# Patient Record
Sex: FEMALE | Race: WHITE | NOT HISPANIC OR LATINO | Employment: OTHER | URBAN - METROPOLITAN AREA
[De-identification: names, ages, dates, MRNs, and addresses within clinical notes are randomized per-mention and may not be internally consistent; named-entity substitution may affect disease eponyms.]

---

## 2017-01-01 ENCOUNTER — PATIENT OUTREACH (OUTPATIENT)
Dept: OTHER | Facility: MEDICAL CENTER | Age: 77
End: 2017-01-01

## 2017-01-30 ENCOUNTER — OFFICE VISIT (OUTPATIENT)
Dept: PULMONOLOGY | Facility: HOSPICE | Age: 77
End: 2017-01-30
Payer: MEDICARE

## 2017-01-30 VITALS
HEIGHT: 62 IN | BODY MASS INDEX: 17.19 KG/M2 | OXYGEN SATURATION: 95 % | WEIGHT: 93.4 LBS | DIASTOLIC BLOOD PRESSURE: 72 MMHG | RESPIRATION RATE: 16 BRPM | SYSTOLIC BLOOD PRESSURE: 112 MMHG | TEMPERATURE: 97 F | HEART RATE: 82 BPM

## 2017-01-30 DIAGNOSIS — J96.11 CHRONIC RESPIRATORY FAILURE WITH HYPOXIA (HCC): ICD-10-CM

## 2017-01-30 DIAGNOSIS — Z87.891 FORMER SMOKER: ICD-10-CM

## 2017-01-30 DIAGNOSIS — R53.81 PHYSICAL DECONDITIONING: ICD-10-CM

## 2017-01-30 DIAGNOSIS — J44.9 CHRONIC OBSTRUCTIVE PULMONARY DISEASE, UNSPECIFIED COPD TYPE (HCC): ICD-10-CM

## 2017-01-30 PROCEDURE — G8419 CALC BMI OUT NRM PARAM NOF/U: HCPCS | Performed by: INTERNAL MEDICINE

## 2017-01-30 PROCEDURE — 0518F FALL PLAN OF CARE DOCD: CPT | Mod: 8P | Performed by: INTERNAL MEDICINE

## 2017-01-30 PROCEDURE — 3288F FALL RISK ASSESSMENT DOCD: CPT | Mod: 8P | Performed by: INTERNAL MEDICINE

## 2017-01-30 PROCEDURE — 1100F PTFALLS ASSESS-DOCD GE2>/YR: CPT | Performed by: INTERNAL MEDICINE

## 2017-01-30 PROCEDURE — 99204 OFFICE O/P NEW MOD 45 MIN: CPT | Performed by: INTERNAL MEDICINE

## 2017-01-30 RX ORDER — DIGOXIN 125 MCG
125 TABLET ORAL DAILY
Refills: 3 | COMMUNITY
Start: 2016-12-22 | End: 2017-07-26

## 2017-01-30 RX ORDER — TIOTROPIUM BROMIDE 18 UG/1
18 CAPSULE ORAL; RESPIRATORY (INHALATION) DAILY
COMMUNITY

## 2017-01-30 RX ORDER — GUAIFENESIN 600 MG/1
600 TABLET, EXTENDED RELEASE ORAL 4 TIMES DAILY
COMMUNITY

## 2017-01-30 RX ORDER — BUDESONIDE AND FORMOTEROL FUMARATE DIHYDRATE 160; 4.5 UG/1; UG/1
AEROSOL RESPIRATORY (INHALATION)
Refills: 3 | COMMUNITY
Start: 2016-12-28

## 2017-01-30 RX ORDER — SUCRALFATE 1 G/1
1 TABLET ORAL
COMMUNITY
End: 2018-01-01

## 2017-01-30 RX ORDER — ZOLPIDEM TARTRATE 5 MG/1
TABLET ORAL
Qty: 3 TAB | Refills: 0 | Status: SHIPPED | OUTPATIENT
Start: 2017-01-30 | End: 2017-07-26

## 2017-01-30 RX ORDER — IPRATROPIUM BROMIDE AND ALBUTEROL SULFATE 2.5; .5 MG/3ML; MG/3ML
3 SOLUTION RESPIRATORY (INHALATION) 4 TIMES DAILY
COMMUNITY

## 2017-01-30 ASSESSMENT — PATIENT HEALTH QUESTIONNAIRE - PHQ9: CLINICAL INTERPRETATION OF PHQ2 SCORE: 0

## 2017-01-30 NOTE — MR AVS SNAPSHOT
"Leanna Campbell   2017 8:20 AM   Office Visit   MRN: [id number]    Department:  [facility]   Dept Phone:  [phone]    Description:  Female : 1940   Provider:  Nita Kent M.D.           Reason for Visit     Establish Care Self Referral for COPD      Allergies as of 2017     Allergen Noted Reactions    Darvon [Propoxyphene Hcl] 2010       Pcn [Penicillins] 2010   Rash    Ciprofloxacin 2010   Rash, Itching    Pt complained of itch on hands, mouth and was red in the face after 2100 dose       You were diagnosed with     Chronic obstructive pulmonary disease, unspecified COPD type (CMS-HCC)   [6259590]       Former smoker   [[id number]]         Vital Signs     Blood Pressure Pulse Temperature Respirations Height Weight    112/72 mmHg 82 36.1 °C (97 °F) 16 1.575 m (5' 2.01\") 42.366 kg (93 lb 6.4 oz)    Body Mass Index Oxygen Saturation Smoking Status             17.08 kg/m2 95% Former Smoker         Basic Information     Date Of Birth Sex Race Ethnicity Preferred Language    1940 Female White Non- English      Your appointments     2017  9:00 AM   Pulmonary Function Test with PFT-RM2   [facility] (--)    [address] [place] [address] [address]  [place] [id number] [phone]            2017 10:00 AM   Pulmonary Function Test with SPIROMETRY/6MW   [facility] (--)    [address] [facility] [address]  [place] [id number] [phone]              Problem List              ICD-10-CM Priority Class Noted - Resolved    Pulmonary insufficiency following trauma and surgery 518.5   2010 - Present    BILE LEAK    2010 - Present    COPD (chronic obstructive pulmonary disease) (CMS-HCC) J44.9 Medium  2010 - Present    Physical deconditioning R53.81   2010 - Present    Alcohol abuse F10.10   2013 - Present    Hip fracture (CMS-HCC) S72.009A High  2013 - Present    Bronchitis, acute " J20.9 Medium  2/2/2013 - Present    COPD exacerbation (CMS-[facility]) J44.1 Medium  2/2/2013 - Present    Anemia D64.9   2/14/2013 - Present    GERD (gastroesophageal reflux disease) K21.9 Low Chronic 2/14/2013 - Present      Health Maintenance     Patient has no pending health maintenance at this time      Current Immunizations     Influenza TIV (IM) 11/1/2016, 10/1/2012    Influenza Vaccine 9/13/2010    Pneumococcal Vaccine (UF)Historical Data 10/1/2009      Below and/or attached are the medications your provider expects you to take. Review all of your home medications and newly ordered medications with your provider and/or pharmacist. Follow medication instructions as directed by your provider and/or pharmacist. Please keep your medication list with you and share with your provider. Update the information when medications are discontinued, doses are changed, or new medications (including over-the-counter products) are added; and carry medication information at all times in the event of emergency situations     Allergies:  DARVON - (reactions not documented)     PCN - Rash     CIPROFLOXACIN - Rash,Itching               Medications  Valid as of: January 30, 2017 -  9:12 AM    Generic Name Brand Name Tablet Size Instructions for use    Aspirin (Tab)  MG Take 325 mg by mouth every day.    Indications: Blood Clot        Budesonide-Formoterol Fumarate (Aerosol) SYMBICORT 160-4.5 MCG/ACT INHALE 2 PUFFS BY MOUTH TWICE DAILY. RINSE MOUTH AFTER USE.        Calcium Carbonate Antacid (Chew Tab) TUMS 500 MG Take 1 Tab by mouth 2 times a day.        Cholecalciferol (Tab) VITAMIN D 400 UNIT Take 1 Tab by mouth 2 Times a Day.        Digoxin (Tab) LANOXIN 125 MCG Take 125 mcg by mouth every day. TAKE 1 TABLET BY MOUTH DAILY        Famotidine (Tab) PEPCID 20 MG Take 20 mg by mouth 2 times a day.    Indications: Gastroesophageal Reflux Disease        Ferrous Sulfate (Tab) ferrous sulfate 325 (65 FE) MG Take 325 mg by mouth 2 Times  a Day.          GuaiFENesin (TABLET SR 12 HR) MUCINEX 600 MG Take 600 mg by mouth every 12 hours.        Hydrocodone-Acetaminophen (Tab) NORCO 5-325 MG Take 1-2 Tabs by mouth every 3 hours as needed.    Indications: Moderate to Moderately Severe Pain        Ipratropium-Albuterol (Aero Soln) COMBIVENT RESPIMAT  MCG/ACT Inhale 1 Puff by mouth 4 times a day.        Ipratropium-Albuterol (Solution) DUONEB 0.5-2.5 (3) MG/3ML 3 mL by Nebulization route 4 times a day.        Multiple Vitamins-Iron (Tab) Multivitamin/Iron  Take 1 Tab by mouth every day.          Sucralfate (Tab) CARAFATE 1 GM Take 1 g by mouth 4 Times a Day,Before Meals and at Bedtime.        Theophylline   Take 200 mg by mouth 2 Times a Day.        Tiotropium Bromide Monohydrate (Cap) SPIRIVA 18 MCG Inhale 18 mcg by mouth every day.        Zolpidem Tartrate (Tab) AMBIEN 5 MG Take 1-2 tablets by mouth every evening as needed for insomnia. Bring to sleep study.        .                 Medicines prescribed today were sent to:     [facility] #[id number] - [place], [place] - [address]    [address] [address]    Phone: [phone] Fax: [phone]    Open 24 Hours?: No    [facility]    [address] [facility] [address] [place] [address]    Phone: [phone] Fax: [phone]      Medication refill instructions:       If your prescription bottle indicates you have medication refills left, it is not necessary to call your provider’s office. Please contact your pharmacy and they will refill your medication.    If your prescription bottle indicates you do not have any refills left, you may request refills at any time through one of the following ways: The online [employer] system (except Urgent Care), by calling your provider’s office, or by asking your pharmacy to contact your provider’s office with a refill request. Medication refills are processed only during regular business hours and may not be available until the next business day. Your provider may  request additional information or to have a follow-up visit with you prior to refilling your medication.   *Please Note: Medication refills are assigned a new Rx number when refilled electronically. Your pharmacy may indicate that no refills were authorized even though a new prescription for the same medication is available at the pharmacy. Please request the medicine by name with the pharmacy before contacting your provider for a refill.        Your To Do List     Future Labs/Procedures Complete By Expires    AMB PULMONARY FUNCTION TEST/LAB  As directed 1/30/2018    AMB PULMONARY STRESS TESTING (6 MIN WALK)  As directed 1/30/2018    CT-CHEST (THORAX) W/O  As directed 1/30/2018    POLYSOMNOGRAPHY, 4 OR MORE  As directed 1/30/2018         [employer] Access Code: [email or web address]  Expires: 3/1/2017  9:12 AM    Your email address is not on file at [facility] [employer].  Email Addresses are required for you to sign up for [employer], please contact [phone] to verify your personal information and to provide your email address prior to attempting to register for [employer].    Leanna Campbell  [address] [place] [id number]    [employer]  A secure, online tool to manage your health information     [employer]’s [employer]® is a secure, online tool that connects you to your personalized health information from the privacy of your home -- day or night - making it very easy for you to manage your healthcare. Once the activation process is completed, you can even access your medical information using the [employer] cisco, which is available for free in the [employer] store or Google Play store.     To learn more about [employer], visit [email or web address]    There are two levels of access available (as shown below):   My Chart Features  [facility] Primary Care Doctor [facility]  Specialists [facility]  Urgent  Care Non-[facility] Primary Care Doctor   Email your healthcare team securely and privately 24/7 X X X    Manage appointments: schedule your next  appointment; view details of past/upcoming appointments X      Request prescription refills. X      View recent personal medical records, including lab and immunizations X X X X   View health record, including health history, allergies, medications X X X X   Read reports about your outpatient visits, procedures, consult and ER notes X X X X   See your discharge summary, which is a recap of your hospital and/or ER visit that includes your diagnosis, lab results, and care plan X X  X     How to register for [employer]:  Once your e-mail address has been verified, follow the following steps to sign up for [employer].     1. Go to  [email or web address]  2. Click on the Sign Up Now box, which takes you to the New Member Sign Up page. You will need to provide the following information:  a. Enter your [employer] Access Code exactly as it appears at the top of this page. (You will not need to use this code after you’ve completed the sign-up process. If you do not sign up before the expiration date, you must request a new code.)   b. Enter your date of birth.   c. Enter your home email address.   d. Click Submit, and follow the next screen’s instructions.  3. Create a [employer] ID. This will be your [employer] login ID and cannot be changed, so think of one that is secure and easy to remember.  4. Create a [employer] password. You can change your password at any time.  5. Enter your Password Reset Question and Answer. This can be used at a later time if you forget your password.   6. Enter your e-mail address. This allows you to receive e-mail notifications when new information is available in [employer].  7. Click Sign Up. You can now view your health information.    For assistance activating your [employer] account, call [phone]

## 2017-01-30 NOTE — PROGRESS NOTES
Chief Complaint   Patient presents with   • Establish Care     Self Referral for COPD       HPI: This patient is a 77 y.o. Female who presents for management of COPD. She is a [facility] in 2011 at which time PFTs showed stage II COPD with FEV1: 1 L, or 46% predicted. She quit smoking in 2013 with 50 pack years estimated. Her shortness of breath has gradually worsened over the past year. She is short of breath with ADLs. She has chronic productive cough and intermittent wheezing. Denies hemoptysis, chest pain, or edema. She is compliant with Symbicort 160 µg, Spiriva and theophylline. She is oxygen dependent at 3 L/m. She has a poor appetite, has lost weight, with a BMI:17. She denies AECOPD over the past year. She is up-to-date on influenza vaccine.    Her daughters were in the medical field, have witnessed apneas while she sleeps. She is unaware of symptoms however has nonrestorative sleep.    Past Medical History   Diagnosis Date   • COPD    • GERD (gastroesophageal reflux disease)    • Bowel obstruction (CMS-HCC)    • On home oxygen therapy      2 liters   • Arthritis      knee, back   • Urinary bladder disorder 11/29/10     UTI, treated with antibiotics   • Heart burn    • Indigestion    • Other specified disorder of intestines      constipation   • Dental disorder      upper and lower dentures   • Breath shortness      uses oxygen 1.5 liters at noc and prn   • Anesthesia      PONV   • Cancer (CMS-HCC) 1998     right breast   • Carcinoma (CMS-HCC) 2002 and 2013     Skin cancer   • Back pain    • Bronchitis    • Breast cancer (CMS-HCC)    • Osteoporosis    • Pneumonia        Social History     Social History   • Marital Status: Single     Spouse Name: N/A   • Number of Children: N/A   • Years of Education: N/A     Occupational History   • Not on file.     Social History Main Topics   • Smoking status: Former Smoker -- 1.00 packs/day for 50 years     Types: Cigarettes     Quit  date: 02/01/2013   • Smokeless tobacco: Former User     Quit date: 02/01/2013   • Alcohol Use: Yes      Comment: Glass of wine 4x/week   • Drug Use: No   • Sexual Activity: Not on file     Other Topics Concern   • Not on file     Social History Narrative       Family History   Problem Relation Age of Onset   • Cancer         Current Outpatient Prescriptions on File Prior to Visit   Medication Sig Dispense Refill   • aspirin (ASA) 325 MG TABS Take 325 mg by mouth every day.    Indications: Blood Clot     • ferrous sulfate (FEOSOL) 325 (65 FE) MG tablet Take 325 mg by mouth 2 Times a Day.       • famotidine (PEPCID) 20 MG TABS Take 20 mg by mouth 2 times a day.    Indications: Gastroesophageal Reflux Disease     • hydrocodone-acetaminophen (NORCO) 5-325 MG TABS per tablet Take 1-2 Tabs by mouth every 3 hours as needed.    Indications: Moderate to Moderately Severe Pain     • Multiple Vitamins-Iron (MULTIVITAMIN/IRON) TABS Take 1 Tab by mouth every day.       • calcium carbonate (TUMS) 500 MG CHEW Take 1 Tab by mouth 2 times a day.     • Cholecalciferol (VITAMIN D) 400 UNIT TABS Take 1 Tab by mouth 2 Times a Day.       No current facility-administered medications on file prior to visit.       Allergies: Darvon; Pcn; and Ciprofloxacin    ROS:   Constitutional: Denies fevers, chills, night sweats, fatigue, + weight loss  Eyes: Denies vision loss, pain, drainage, double vision  Ears, Nose, Throat: Denies earache, difficulty hearing, tinnitus, nasal congestion, hoarseness  Cardiovascular: Denies chest pain, tightness, palpitations, orthopnea or edema  Respiratory: As in history of present illness  Sleep: Denies daytime sleepiness, snoring, +apneas, denies insomnia, morning headaches  GI: Denies heartburn, dysphagia, nausea, abdominal pain, +iarrhea, denies constipation  : Denies frequent urination, hematuria, discharge or painful urination  Musculoskeletal: Denies back pain, painful joints, sore muscles  Neurological:  "Denies weakness or headaches  Skin: No rashes    Blood pressure 112/72, pulse 82, temperature 36.1 °C (97 °F), resp. rate 16, height 1.575 m (5' 2.01\"), weight 42.366 kg (93 lb 6.4 oz), SpO2 95 %.  Multi-Ox Readings  Multi Ox #1     O2 sat % at rest     O2 sat % on exertion     O2 sat average on exertion     Multi Ox #2     O2 sat % at rest     O2 sat % on exertion     O2 sat average on exertion       Oxygen Use 3   Oxygen Frequency With exertion and nocturnal   Duration of need     Is the patient mobile within the home?     CPAP Use?     BIPAP Use?     Servo Titration         Physical Exam:  Appearance: Cachectic, in no acute distress  HEENT: Normocephalic, atraumatic, white sclera, PERRLA, oropharynx clear  Neck: No adenopathy or masses  Respiratory: no intercostal retractions or accessory muscle use  Lungs auscultation: Clear to auscultation bilaterally, diminished breath sounds  Cardiovascular: Regular rate rhythm. No murmurs, rubs or gallops.  No LE edema  Abdomen: soft, nondistended  Gait: Normal  Digits: No clubbing, cyanosis  Motor: No focal deficits  Orientation: Oriented to time, person and place    Diagnosis:  1. Chronic obstructive pulmonary disease, unspecified COPD type (CMS-HCC)  AMB PULMONARY FUNCTION TEST/LAB    AMB PULMONARY STRESS TESTING (6 MIN WALK)    CT-CHEST (THORAX) W/O    POLYSOMNOGRAPHY, 4 OR MORE    zolpidem (AMBIEN) 5 MG Tab   2. Chronic respiratory failure with hypoxia (CMS-HCC)     3. Former smoker     4. Physical deconditioning         Plan:  The patient has progressive respiratory symptoms from advanced COPD.  We will update pulmonary function testing with DLCO for qualification for pulmonary rehabilitation.  Obtain 6 minute walk test on 3 L/m oxygen.   Evaluation for portable oxygen concentrator.  Chest CAT scan without contrast to exclude other pulmonary comorbidities, i.e., infection, malignancy.  Overnight polysomnography for screening of sleep-disordered breathing.  Continue " Symbicort 160/4.5 at 2 puffs twice a day, Spiriva at one puff daily, theophylline, oxygen at 3 L/m.  Start Ensure one can daily.  Return for after testing.

## 2017-01-31 ENCOUNTER — HOSPITAL ENCOUNTER (OUTPATIENT)
Dept: RADIOLOGY | Facility: MEDICAL CENTER | Age: 77
End: 2017-01-31

## 2017-02-22 ENCOUNTER — NON-PROVIDER VISIT (OUTPATIENT)
Dept: PULMONOLOGY | Facility: HOSPICE | Age: 77
End: 2017-02-22
Attending: INTERNAL MEDICINE
Payer: MEDICARE

## 2017-02-22 ENCOUNTER — NON-PROVIDER VISIT (OUTPATIENT)
Dept: PULMONOLOGY | Facility: HOSPICE | Age: 77
End: 2017-02-22
Payer: MEDICARE

## 2017-02-22 DIAGNOSIS — J44.9 CHRONIC OBSTRUCTIVE PULMONARY DISEASE, UNSPECIFIED COPD TYPE (HCC): ICD-10-CM

## 2017-02-22 ASSESSMENT — 6 MINUTE WALK TEST (6MWT)
TOTAL DISTANCE WALKED: 940
PERCENT OF NORMAL WALKED: 57
SAO2 AT 4 MIN: 93
PERCEIVED FATIGUE AT 1 MIN: 0
HEART RATE AT 6 MIN: 105
DISTANCE WALKED (FT): 160
PERCEIVED FATIGUE AT 5 MIN: 0
PERCEIVED FATIGUE AT 3 MIN: 0
SAO2 AT 1 MIN: 94
HEART RATE AT 4 MIN: 102
PERCEIVED FATIGUE AT 6 MIN: 0
SAO2 AT 3 MIN: 95
HEART RATE AT 1 MIN: 100
PERCEIVED FATIGUE AT 1 MIN: 0
DISTANCE WALKED (FT): 140
HEART RATE AT 1 MIN: 95
BLOOD PRESSURE AT 1 MIN: 130/82
O2 FLOW RATE - LITERS PER MIN: 3
O2 SAT PERCENT ON O2: 93
SAO2 AT 1 MIN: 95
BLOOD PRESSURE: RIGHT LEG
HEART RATE: 93
SAO2 AT 2 MIN: 94
DISTANCE WALKED (FT): 180
NUMBER OF RESTS: 0
DISTANCE WALKED (FT): 140
PERCEIVED FATIGUE AT 4 MIN: 0
HEART RATE AT 5 MIN: 104
HEART RATE AT 2 MIN: 100
PERCEIVED BREATHLESSNESS AT 1 MIN: 4
SAO2 AT 2 MIN: 94
HEART RATE AT 3 MIN: 102
SITTING BLOOD PRESSURE: 128/80
DISTANCE WALKED (FT): 180
SAO2 AT 6 MIN: 94
O2 SATURATION: CONTINUOUS
PERCEIVED BREATHLESSNESS AT 1 MIN: 5
TOTAL REST TIME: 0
SAO2 AT 5 MIN: 94
PERCEIVED BREATHLESSNESS AT 6 MIN: 6
PERCEIVED FATIGUE AT 2 MIN: 0
PERCEIVED FATIGUE AT 2 MIN: 0
DISTANCE WALKED (FT): 140
HEART RATE AT 2 MIN: 88
PERCEIVED BREATHLESSNESS AT 3 MIN: 5
AMBULATES WITH O2: WITH O2
PERCEIVED BREATHLESSNESS AT 2 MIN: 5
PERCEIVED BREATHLESSNESS AT 4 MIN: 5
PERCEIVED BREATHLESSNESS AT 5 MIN: 6
PERCEIVED BREATHLESSNESS AT 2 MIN: 5

## 2017-02-22 ASSESSMENT — PULMONARY FUNCTION TESTS
FVC: 1.96
FVC_PREDICTED: 2.69
FEV1: .75
FVC: 1.53
FEV1/FVC: 44
FEV1_PERCENT_PREDICTED: 34
FEV1/FVC: 38.27
FEV1/FVC_PERCENT_PREDICTED: 61
FVC_PERCENT_PREDICTED: 56
FEV1: .68
FEV1_PERCENT_CHANGE: 28
FEV1_PERCENT_PREDICTED: 37
FEV1/FVC_PERCENT_PREDICTED: 75
FEV1_PREDICTED: 2.01
FEV1/FVC_PERCENT_PREDICTED: 51
FEV1_PERCENT_CHANGE: 8
FVC_PERCENT_PREDICTED: 72
FEV1/FVC_PERCENT_CHANGE: 29

## 2017-02-22 NOTE — PROCEDURES
Test on 3 lpm 02.    This is a 6MWT. Entire 6 minutes were completed on supplemental oxygen at a rate of 3LPM. Lowest SpO2 was recorded as 93%. A total of 940 ft was completed without stops. HR was 100-105bpm.    Impression:  No desaturation on 3LPM of oxygen.

## 2017-02-22 NOTE — MR AVS SNAPSHOT
Leanna Larkin Adrian   2017 9:00 AM   Non-Provider Visit   MRN: [id number]    Department:  [facility]   Dept Phone:  [phone]    Description:  Female : 1940   Provider:  Juan R Juarez M.D.           Reason for Visit     COPD           Allergies as of 2017     Allergen Noted Reactions    Darvon [Propoxyphene Hcl] 2010       Pcn [Penicillins] 2010   Rash    Ciprofloxacin 2010   Rash, Itching    Pt complained of itch on hands, mouth and was red in the face after 2100 dose       You were diagnosed with     Chronic obstructive pulmonary disease, unspecified COPD type (CMS-HCC)   [2304775]         Vital Signs     Smoking Status                   Former Smoker           Basic Information     Date Of Birth Sex Race Ethnicity Preferred Language    1940 Female White Non- English      Your appointments     Mar 01, 2017  7:10 PM   Sleep Study Diagnostic with SLEEP TECH   [facility] (--)    [address]  [address]  [place] [id number] [phone]            2017  9:40 AM   Established Patient Pul with Juan R Juarez M.D.   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.5   2010 - Present    BILE LEAK    2010 - Present    COPD (chronic obstructive pulmonary disease) (CMS-HCC) J44.9 Medium  2010 - Present    Physical deconditioning R53.81   2010 - Present    Alcohol abuse F10.10   2013 - Present    Hip fracture (CMS-HCC) S72.009A High  2013 - Present    Bronchitis, acute J20.9 Medium  2013 - Present    COPD exacerbation (CMS-HCC) J44.1 Medium  2013 - Present    Anemia D64.9   2013 - Present    GERD (gastroesophageal reflux disease) K21.9 Low Chronic 2013 - Present      Health  Maintenance        Date Due Completion Dates    IMM DTaP/Tdap/Td Vaccine (1 - Tdap) 1/16/1961 ---    PAP SMEAR 1/16/1963 ---    COLONOSCOPY 1/16/1992 ---    IMM ZOSTER VACCINE 1/16/2002 ---    BONE DENSITY 1/16/2007 ---    IMM PNEUMOCOCCAL 65+ (ADULT) LOW/MEDIUM RISK SERIES (1 of 2 - PCV13) 1/16/2007 ---    MAMMOGRAM 9/28/2008 9/28/2007, 9/28/2007            Current Immunizations     Influenza TIV (IM) 11/1/2016, 10/1/2012    Influenza Vaccine 9/13/2010    Pneumococcal Vaccine (UF)Historical Data 10/1/2009      Below and/or attached are the medications your provider expects you to take. Review all of your home medications and newly ordered medications with your provider and/or pharmacist. Follow medication instructions as directed by your provider and/or pharmacist. Please keep your medication list with you and share with your provider. Update the information when medications are discontinued, doses are changed, or new medications (including over-the-counter products) are added; and carry medication information at all times in the event of emergency situations     Allergies:  DARVON - (reactions not documented)     PCN - Rash     CIPROFLOXACIN - Rash,Itching               Medications  Valid as of: February 22, 2017 - 10:11 AM    Generic Name Brand Name Tablet Size Instructions for use    Aspirin (Tab)  MG Take 325 mg by mouth every day.    Indications: Blood Clot        Budesonide-Formoterol Fumarate (Aerosol) SYMBICORT 160-4.5 MCG/ACT INHALE 2 PUFFS BY MOUTH TWICE DAILY. RINSE MOUTH AFTER USE.        Calcium Carbonate Antacid (Chew Tab) TUMS 500 MG Take 1 Tab by mouth 2 times a day.        Cholecalciferol (Tab) VITAMIN D 400 UNIT Take 1 Tab by mouth 2 Times a Day.        Digoxin (Tab) LANOXIN 125 MCG Take 125 mcg by mouth every day. TAKE 1 TABLET BY MOUTH DAILY        Famotidine (Tab) PEPCID 20 MG Take 20 mg by mouth 2 times a day.    Indications: Gastroesophageal Reflux Disease        Ferrous Sulfate (Tab)  ferrous sulfate 325 (65 FE) MG Take 325 mg by mouth 2 Times a Day.          GuaiFENesin (TABLET SR 12 HR) MUCINEX 600 MG Take 600 mg by mouth every 12 hours.        Hydrocodone-Acetaminophen (Tab) NORCO 5-325 MG Take 1-2 Tabs by mouth every 3 hours as needed.    Indications: Moderate to Moderately Severe Pain        Ipratropium-Albuterol (Aero Soln) COMBIVENT RESPIMAT  MCG/ACT Inhale 1 Puff by mouth 4 times a day.        Ipratropium-Albuterol (Solution) DUONEB 0.5-2.5 (3) MG/3ML 3 mL by Nebulization route 4 times a day.        Multiple Vitamins-Iron (Tab) Multivitamin/Iron  Take 1 Tab by mouth every day.          Sucralfate (Tab) CARAFATE 1 GM Take 1 g by mouth 4 Times a Day,Before Meals and at Bedtime.        Theophylline   Take 200 mg by mouth 2 Times a Day.        Tiotropium Bromide Monohydrate (Cap) SPIRIVA 18 MCG Inhale 18 mcg by mouth every day.        Zolpidem Tartrate (Tab) AMBIEN 5 MG Take 1-2 tablets by mouth every evening as needed for insomnia. Bring to sleep study.        .                 Medicines prescribed today were sent to:     [facility]/PHARMACY #[id number] - [place], [place] - [address]    [address] [place] [address]    Phone: [phone] Fax: [phone]    Open 24 Hours?: No    [facility]    [address] [place] [address]    Phone: [phone] Fax: [phone]      Medication refill instructions:       If your prescription bottle indicates you have medication refills left, it is not necessary to call your provider’s office. Please contact your pharmacy and they will refill your medication.    If your prescription bottle indicates you do not have any refills left, you may request refills at any time through one of the following ways: The online ONtheAIR system (except Urgent Care), by calling your provider’s office, or by asking your pharmacy to contact your provider’s office with a refill request. Medication refills are processed only during regular business hours and may not be  available until the next business day. Your provider may request additional information or to have a follow-up visit with you prior to refilling your medication.   *Please Note: Medication refills are assigned a new Rx number when refilled electronically. Your pharmacy may indicate that no refills were authorized even though a new prescription for the same medication is available at the pharmacy. Please request the medicine by name with the pharmacy before contacting your provider for a refill.           Markr Access Code: [email or web address]  Expires: 3/1/2017  9:12 AM    Markr  A secure, online tool to manage your health information     [employer]’s Markr® is a secure, online tool that connects you to your personalized health information from the privacy of your home -- day or night - making it very easy for you to manage your healthcare. Once the activation process is completed, you can even access your medical information using the Markr cisco, which is available for free in the Apple Cisco store or Google Play store.     Markr provides the following levels of access (as shown below):   My Chart Features   [facility] Primary Care Doctor [facility]  Specialists [facility]  Urgent  Care Non-[facility]  Primary Care  Doctor   Email your healthcare team securely and privately 24/7 X X X    Manage appointments: schedule your next appointment; view details of past/upcoming appointments X      Request prescription refills. X      View recent personal medical records, including lab and immunizations X X X X   View health record, including health history, allergies, medications X X X X   Read reports about your outpatient visits, procedures, consult and ER notes X X X X   See your discharge summary, which is a recap of your hospital and/or ER visit that includes your diagnosis, lab results, and care plan. X X       How to register for Markr:  1. Go to  [email or web address].  2. Click on the Sign Up Now box, which takes  you to the New Member Sign Up page. You will need to provide the following information:  a. Enter your tomoguides Access Code exactly as it appears at the top of this page. (You will not need to use this code after you’ve completed the sign-up process. If you do not sign up before the expiration date, you must request a new code.)   b. Enter your date of birth.   c. Enter your home email address.   d. Click Submit, and follow the next screen’s instructions.  3. Create a tomoguides ID. This will be your tomoguides login ID and cannot be changed, so think of one that is secure and easy to remember.  4. Create a tomoguides password. You can change your password at any time.  5. Enter your Password Reset Question and Answer. This can be used at a later time if you forget your password.   6. Enter your e-mail address. This allows you to receive e-mail notifications when new information is available in tomoguides.  7. Click Sign Up. You can now view your health information.    For assistance activating your tomoguides account, call [phone]

## 2017-02-22 NOTE — PROCEDURES
Technician: Kim Reyes, RRT/CPFT  Technician Comments:  Good patient effort & cooperation.  The results of this test meet the ATS/ERS standards for acceptability and repeatability.  Test was performed on the [employer] Body Plethysmograph- Elite DX system.  Predicted equations for Spirometry are NHanes, DLCO- [facility].  The DLCO was uncorrected for Hgb.  A bronchodilator of Ventolin HFA- 2puffs via spacer were administered.    Spirometry is consistent with severe airflow obstruction and there was a significant improvement after bronchodilator. The forced vital capacity increased from 1.53 L to 1.96 L and at 28% improvement. Lung volumes are consistent with moderate hyperinflation and severe air trapping. The diffusion capacity test is moderately reduced. Flow volume loops are consistent with airflow obstruction.

## 2017-02-22 NOTE — MR AVS SNAPSHOT
Leanna Larkin Adrian   2017 10:00 AM   Appointment   MRN: [id number]    Department:  [facility]   Dept Phone:  [phone]    Description:  Female : 1940   Provider:  SPIROMETRY/6MW           Allergies as of 2017     Allergen Noted Reactions    Darvon [Propoxyphene Hcl] 2010       Pcn [Penicillins] 2010   Rash    Ciprofloxacin 2010   Rash, Itching    Pt complained of itch on hands, mouth and was red in the face after 2100 dose       You were diagnosed with     Chronic obstructive pulmonary disease, unspecified COPD type (CMS-HCC)   [0858704]         Vital Signs     Smoking Status                   Former Smoker           Basic Information     Date Of Birth Sex Race Ethnicity Preferred Language    1940 Female White Non- English      Your appointments     Mar 01, 2017  7:10 PM   Sleep Study Diagnostic with SLEEP TECH   [facility] (--)    [address]  [place] [address] [phone]            2017  9:40 AM   Established Patient Pul with Juan R Juarez M.D.   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   2010 - Present    BILE LEAK    2010 - Present    COPD (chronic obstructive pulmonary disease) (CMS-HCC) J44.9 Medium  2010 - Present    Physical deconditioning R53.81   2010 - Present    Alcohol abuse F10.10   2013 - Present    Hip fracture (CMS-HCC) S72.009A High  2013 - Present    Bronchitis, acute J20.9 Medium  2013 - Present    COPD exacerbation (CMS-HCC) J44.1 Medium  2013 - Present    Anemia D64.9   2013 - Present    GERD (gastroesophageal reflux disease) K21.9 Low Chronic 2013 - Present      Health Maintenance        Date Due Completion Dates    IMM  DTaP/Tdap/Td Vaccine (1 - Tdap) 1/16/1961 ---    PAP SMEAR 1/16/1963 ---    COLONOSCOPY 1/16/1992 ---    IMM ZOSTER VACCINE 1/16/2002 ---    BONE DENSITY 1/16/2007 ---    IMM PNEUMOCOCCAL 65+ (ADULT) LOW/MEDIUM RISK SERIES (1 of 2 - PCV13) 1/16/2007 ---    MAMMOGRAM 9/28/2008 9/28/2007, 9/28/2007            Current Immunizations     Influenza TIV (IM) 11/1/2016, 10/1/2012    Influenza Vaccine 9/13/2010    Pneumococcal Vaccine (UF)Historical Data 10/1/2009      Below and/or attached are the medications your provider expects you to take. Review all of your home medications and newly ordered medications with your provider and/or pharmacist. Follow medication instructions as directed by your provider and/or pharmacist. Please keep your medication list with you and share with your provider. Update the information when medications are discontinued, doses are changed, or new medications (including over-the-counter products) are added; and carry medication information at all times in the event of emergency situations     Allergies:  DARVON - (reactions not documented)     PCN - Rash     CIPROFLOXACIN - Rash,Itching               Medications  Valid as of: February 22, 2017 - 10:18 AM    Generic Name Brand Name Tablet Size Instructions for use    Aspirin (Tab)  MG Take 325 mg by mouth every day.    Indications: Blood Clot        Budesonide-Formoterol Fumarate (Aerosol) SYMBICORT 160-4.5 MCG/ACT INHALE 2 PUFFS BY MOUTH TWICE DAILY. RINSE MOUTH AFTER USE.        Calcium Carbonate Antacid (Chew Tab) TUMS 500 MG Take 1 Tab by mouth 2 times a day.        Cholecalciferol (Tab) VITAMIN D 400 UNIT Take 1 Tab by mouth 2 Times a Day.        Digoxin (Tab) LANOXIN 125 MCG Take 125 mcg by mouth every day. TAKE 1 TABLET BY MOUTH DAILY        Famotidine (Tab) PEPCID 20 MG Take 20 mg by mouth 2 times a day.    Indications: Gastroesophageal Reflux Disease        Ferrous Sulfate (Tab) ferrous sulfate 325 (65 FE) MG Take 325 mg by mouth  2 Times a Day.          GuaiFENesin (TABLET SR 12 HR) MUCINEX 600 MG Take 600 mg by mouth every 12 hours.        Hydrocodone-Acetaminophen (Tab) NORCO 5-325 MG Take 1-2 Tabs by mouth every 3 hours as needed.    Indications: Moderate to Moderately Severe Pain        Ipratropium-Albuterol (Aero Soln) COMBIVENT RESPIMAT  MCG/ACT Inhale 1 Puff by mouth 4 times a day.        Ipratropium-Albuterol (Solution) DUONEB 0.5-2.5 (3) MG/3ML 3 mL by Nebulization route 4 times a day.        Multiple Vitamins-Iron (Tab) Multivitamin/Iron  Take 1 Tab by mouth every day.          Sucralfate (Tab) CARAFATE 1 GM Take 1 g by mouth 4 Times a Day,Before Meals and at Bedtime.        Theophylline   Take 200 mg by mouth 2 Times a Day.        Tiotropium Bromide Monohydrate (Cap) SPIRIVA 18 MCG Inhale 18 mcg by mouth every day.        Zolpidem Tartrate (Tab) AMBIEN 5 MG Take 1-2 tablets by mouth every evening as needed for insomnia. Bring to sleep study.        .                 Medicines prescribed today were sent to:     [facility]/PHARMACY #[id number] - [place], [place] - [address]    [address] [address]    Phone: [phone] Fax: [phone]    Open 24 Hours?: No    [facility]    [address] [place] [address]    Phone: [phone] Fax: [phone]      Medication refill instructions:       If your prescription bottle indicates you have medication refills left, it is not necessary to call your provider’s office. Please contact your pharmacy and they will refill your medication.    If your prescription bottle indicates you do not have any refills left, you may request refills at any time through one of the following ways: The online Applauze system (except Urgent Care), by calling your provider’s office, or by asking your pharmacy to contact your provider’s office with a refill request. Medication refills are processed only during regular business hours and may not be available until the next business day. Your provider  may request additional information or to have a follow-up visit with you prior to refilling your medication.   *Please Note: Medication refills are assigned a new Rx number when refilled electronically. Your pharmacy may indicate that no refills were authorized even though a new prescription for the same medication is available at the pharmacy. Please request the medicine by name with the pharmacy before contacting your provider for a refill.           moneymeets Access Code: [email or web address]  Expires: 3/1/2017  9:12 AM    moneymeets  A secure, online tool to manage your health information     [employer]’s moneymeets® is a secure, online tool that connects you to your personalized health information from the privacy of your home -- day or night - making it very easy for you to manage your healthcare. Once the activation process is completed, you can even access your medical information using the moneymeets cisco, which is available for free in the Apple Cisco store or Google Play store.     moneymeets provides the following levels of access (as shown below):   My Chart Features   [facility] Primary Care Doctor [facility]  Specialists [facility]  Urgent  Care [facility]  Primary Care  Doctor   Email your healthcare team securely and privately 24/7 X X X    Manage appointments: schedule your next appointment; view details of past/upcoming appointments X      Request prescription refills. X      View recent personal medical records, including lab and immunizations X X X X   View health record, including health history, allergies, medications X X X X   Read reports about your outpatient visits, procedures, consult and ER notes X X X X   See your discharge summary, which is a recap of your hospital and/or ER visit that includes your diagnosis, lab results, and care plan. X X       How to register for moneymeets:  1. Go to  [email or web address].  2. Click on the Sign Up Now box, which takes you to the New Member Sign Up page. You will need to  provide the following information:  a. Enter your Glythera Access Code exactly as it appears at the top of this page. (You will not need to use this code after you’ve completed the sign-up process. If you do not sign up before the expiration date, you must request a new code.)   b. Enter your date of birth.   c. Enter your home email address.   d. Click Submit, and follow the next screen’s instructions.  3. Create a Glythera ID. This will be your Glythera login ID and cannot be changed, so think of one that is secure and easy to remember.  4. Create a Glythera password. You can change your password at any time.  5. Enter your Password Reset Question and Answer. This can be used at a later time if you forget your password.   6. Enter your e-mail address. This allows you to receive e-mail notifications when new information is available in Glythera.  7. Click Sign Up. You can now view your health information.    For assistance activating your Glythera account, call [phone]

## 2017-03-01 ENCOUNTER — SLEEP STUDY (OUTPATIENT)
Dept: SLEEP MEDICINE | Facility: MEDICAL CENTER | Age: 77
End: 2017-03-01
Attending: INTERNAL MEDICINE
Payer: MEDICARE

## 2017-03-01 DIAGNOSIS — J44.9 CHRONIC OBSTRUCTIVE PULMONARY DISEASE, UNSPECIFIED COPD TYPE (HCC): ICD-10-CM

## 2017-03-01 NOTE — MR AVS SNAPSHOT
Leanna Larkin Adrian   3/1/2017 7:10 PM   Sleep Study   MRN: [id number]    Department:  [facility]   Dept Phone:  [phone]    Description:  Female : 1940   Provider:  Trey BRITO M.D.           Allergies as of 3/1/2017     Allergen Noted Reactions    Darvon [Propoxyphene Hcl] 2010       Pcn [Penicillins] 2010   Rash    Ciprofloxacin 2010   Rash, Itching    Pt complained of itch on hands, mouth and was red in the face after 2100 dose       You were diagnosed with     Chronic obstructive pulmonary disease, unspecified COPD type (CMS-HCC)   [5118251]         Vital Signs     Smoking Status                   Former Smoker           Basic Information     Date Of Birth Sex Race Ethnicity Preferred Language    1940 Female White Non- English      Your appointments     2017  9:40 AM   Established Patient Pul with Juan R Juarez M.D.   [facility] (--)    [address] [facility] [address]  [facility] [id number] [phone]              Problem List              ICD-10-CM Priority Class Noted - Resolved    Pulmonary insufficiency following trauma and surgery 518.5   2010 - Present    BILE LEAK    2010 - Present    COPD (chronic obstructive pulmonary disease) (CMS-HCC) J44.9 Medium  2010 - Present    Physical deconditioning R53.81   2010 - Present    Alcohol abuse F10.10   2013 - Present    Hip fracture (CMS-HCC) S72.009A High  2013 - Present    Bronchitis, acute J20.9 Medium  2013 - Present    COPD exacerbation (CMS-HCC) J44.1 Medium  2013 - Present    Anemia D64.9   2013 - Present    GERD (gastroesophageal reflux disease) K21.9 Low Chronic 2013 - Present      Health Maintenance        Date Due Completion Dates    IMM DTaP/Tdap/Td Vaccine (1 - Tdap) 1961 ---    PAP SMEAR 1963 ---    COLONOSCOPY 1992 ---    IMM ZOSTER VACCINE 2002 ---    BONE DENSITY 2007 ---    IMM  PNEUMOCOCCAL 65+ (ADULT) LOW/MEDIUM RISK SERIES (1 of 2 - PCV13) 1/16/2007 ---    MAMMOGRAM 9/28/2008 9/28/2007, 9/28/2007            Current Immunizations     Influenza TIV (IM) 11/1/2016, 10/1/2012    Influenza Vaccine 9/13/2010    Pneumococcal Vaccine (UF)Historical Data 10/1/2009      Below and/or attached are the medications your provider expects you to take. Review all of your home medications and newly ordered medications with your provider and/or pharmacist. Follow medication instructions as directed by your provider and/or pharmacist. Please keep your medication list with you and share with your provider. Update the information when medications are discontinued, doses are changed, or new medications (including over-the-counter products) are added; and carry medication information at all times in the event of emergency situations     Allergies:  DARVON - (reactions not documented)     PCN - Rash     CIPROFLOXACIN - Rash,Itching               Medications  Valid as of: March 02, 2017 -  6:47 AM    Generic Name Brand Name Tablet Size Instructions for use    Aspirin (Tab)  MG Take 325 mg by mouth every day.    Indications: Blood Clot        Budesonide-Formoterol Fumarate (Aerosol) SYMBICORT 160-4.5 MCG/ACT INHALE 2 PUFFS BY MOUTH TWICE DAILY. RINSE MOUTH AFTER USE.        Calcium Carbonate Antacid (Chew Tab) TUMS 500 MG Take 1 Tab by mouth 2 times a day.        Cholecalciferol (Tab) VITAMIN D 400 UNIT Take 1 Tab by mouth 2 Times a Day.        Digoxin (Tab) LANOXIN 125 MCG Take 125 mcg by mouth every day. TAKE 1 TABLET BY MOUTH DAILY        Famotidine (Tab) PEPCID 20 MG Take 20 mg by mouth 2 times a day.    Indications: Gastroesophageal Reflux Disease        Ferrous Sulfate (Tab) ferrous sulfate 325 (65 FE) MG Take 325 mg by mouth 2 Times a Day.          GuaiFENesin (TABLET SR 12 HR) MUCINEX 600 MG Take 600 mg by mouth every 12 hours.        Hydrocodone-Acetaminophen (Tab) NORCO 5-325 MG Take 1-2 Tabs by  mouth every 3 hours as needed.    Indications: Moderate to Moderately Severe Pain        Ipratropium-Albuterol (Aero Soln) COMBIVENT RESPIMAT  MCG/ACT Inhale 1 Puff by mouth 4 times a day.        Ipratropium-Albuterol (Solution) DUONEB 0.5-2.5 (3) MG/3ML 3 mL by Nebulization route 4 times a day.        Multiple Vitamins-Iron (Tab) Multivitamin/Iron  Take 1 Tab by mouth every day.          Sucralfate (Tab) CARAFATE 1 GM Take 1 g by mouth 4 Times a Day,Before Meals and at Bedtime.        Theophylline   Take 200 mg by mouth 2 Times a Day.        Tiotropium Bromide Monohydrate (Cap) SPIRIVA 18 MCG Inhale 18 mcg by mouth every day.        Zolpidem Tartrate (Tab) AMBIEN 5 MG Take 1-2 tablets by mouth every evening as needed for insomnia. Bring to sleep study.        .                 Medicines prescribed today were sent to:     [facility]Y #[id number] - [place], [place] - [address]    [address] [place] [address]    Phone: [phone] Fax: [phone]    Open 24 Hours?: No    DME [facility]    [address] [facility] [address] [place] [address]    Phone: [phone] Fax: [phone]      Medication refill instructions:       If your prescription bottle indicates you have medication refills left, it is not necessary to call your provider’s office. Please contact your pharmacy and they will refill your medication.    If your prescription bottle indicates you do not have any refills left, you may request refills at any time through one of the following ways: The online [employer] system (except Urgent Care), by calling your provider’s office, or by asking your pharmacy to contact your provider’s office with a refill request. Medication refills are processed only during regular business hours and may not be available until the next business day. Your provider may request additional information or to have a follow-up visit with you prior to refilling your medication.   *Please Note: Medication refills are assigned a new Rx number  when refilled electronically. Your pharmacy may indicate that no refills were authorized even though a new prescription for the same medication is available at the pharmacy. Please request the medicine by name with the pharmacy before contacting your provider for a refill.           MyChart Status: Patient Declined

## 2017-03-02 NOTE — PROCEDURES
CLINICAL COMMENTS:  The patient underwent a split night polysomnogram with a CPAP titration using the standard montage for measurement of parameters of sleep, respiratory events, movement abnormalities, heart rate and rhythm. A microphone was used to monitor snoring.    ANALYSIS: The diagnostic recording time was 269.7 minutes with a sleep period of 243.0 minutes.  Total sleep time was 222.5 minutes with a sleep efficiency of 82.5%.  The sleep latency was 26.7 minutes, and REM latency was 71.5 minutes.  The patient had 28 arousals in total, for an arousal index of 7.6.        RESPIRATORY: The patient had 35 apneas in total.  Of these, 35 were obstructive apneas, and 0 were central apneas.  This resulted in an apnea index (AI) of 9.4.  The patient had 22 hypopneas in total, which resulted in a hypopnea index of 5.9.  The overall AHI was 15.4, while the AHI during REM was 41.9.  The supine AHI = 15.4.    OXIMETRY: Oxygen saturation monitoring showed a mean SpO2 of 90.5% for the diagnostic part of the study, with a minimum oxygen saturation of 76.0%.  Oxygen saturations were below 89% for 109.3% of sleep time.    CARDIAC: The highest heart rate for the first part of the study was 90.0 beats per minute.  The average heart rate during sleep was 68.9 bpm, while the highest heart rate was 84.0 bpm.    LIMB MOVEMENTS: There were a total of 0 periodic limb movements during sleep, of which 0 were PLMS arousals.  This resulted in a PLMS index of 0.0 and a PLMS arousal index of 0.0.    TREATMENT    Treatment recording time was 210.4 minutes with a total sleep time of 91.0min.  The patient had an arousal index of 23.7.      RESPIRATORY: The patient had 5 obstructive apneas, 1 central apneas, and 0 hypopneas for an overall AHI was 4.0.    OXIMETRY: The mean SpO2 during treatment was 87.8%, with a minimum oxygen saturation of 83.0%.    OXIMETRY: The mean SpO2 during treatment was 87.8%, with a minimum oxygen saturation of  83.0%.        Interpretation:    This is a split-night study.    In the diagnostic phase there is fragmentation of sleep with a reduced sleep efficiency, prolonged sleep onset latency, and a modest elevation in the arousal and awakening index.  There are no periodic limb movements.  The apnea hypopnea index is 15.4 events per hour, including obstructive apnea and hypopnea events.  The events are more likely to occur in rem sleep where the apnea hypopnea index is 41.9 events per hour.  There are some respiratory effort related arousals and the respiratory disturbance index is 17.5 events per hour.  The lowest arterial oxygen saturation is 76% on room air.  She spends about 50% of the diagnostic time with a saturation below 90%.    In the treatment phase of the study there is a deterioration in sleep continuity with a marked increase in wake after sleep onset time and a rise in the arousal and awakening index.  There are frequent periodic limb movements but they do not affect sleep continuity.  The study was begun on the patient's usual supplemental oxygen but she was gradually reduced room air to the course of the study in order to avoid masking sleep breathing events.  CPAP was titrated across a pressure range of 5-7 cm water pressure.  Except for occasional obstructive apnea episodes in rem sleep, the respiratory disturbance index was essentially 0 on CPAP but hypoxemia persisted with a mean arterial oxygen saturation of 85-88% and a minimum saturation of 84% on room air.    Assessment:   Moderate obstructive sleep apnea hypopnea with an apnea hypopnea index of 15.4 events per hour.  Severe nocturnal hypoxemia with a lowest arterial oxygen saturation of 76% on room air.  Fragmentation of sleep related to the breathing events and probably to treatment effect as well.  There is an excellent response to CPAP therapy but hypoxemia persists at optimal CPAP levels requiring the continuation of supplemental oxygen in  addition to CPAP.    Recommendations:   CPAP at 7 cm water pressure.  Auto titrating CPAP with a pressure range of 5-10 cm water would be an alternative.  With either treatment the patient will require supplemental oxygen at 2-3 L/m.  She did best with a small Quattro air fullface mask and heated humidification.

## 2017-03-07 ENCOUNTER — OFFICE VISIT (OUTPATIENT)
Dept: PULMONOLOGY | Facility: HOSPICE | Age: 77
End: 2017-03-07
Payer: MEDICARE

## 2017-03-07 VITALS
HEART RATE: 108 BPM | TEMPERATURE: 98.2 F | SYSTOLIC BLOOD PRESSURE: 90 MMHG | BODY MASS INDEX: 16.75 KG/M2 | OXYGEN SATURATION: 96 % | HEIGHT: 62 IN | RESPIRATION RATE: 16 BRPM | DIASTOLIC BLOOD PRESSURE: 70 MMHG | WEIGHT: 91 LBS

## 2017-03-07 DIAGNOSIS — J96.11 CHRONIC RESPIRATORY FAILURE WITH HYPOXIA (HCC): ICD-10-CM

## 2017-03-07 DIAGNOSIS — J44.9 CHRONIC OBSTRUCTIVE PULMONARY DISEASE, UNSPECIFIED COPD TYPE (HCC): ICD-10-CM

## 2017-03-07 DIAGNOSIS — G47.33 OSA (OBSTRUCTIVE SLEEP APNEA): ICD-10-CM

## 2017-03-07 PROCEDURE — 3288F FALL RISK ASSESSMENT DOCD: CPT | Mod: 8P | Performed by: INTERNAL MEDICINE

## 2017-03-07 PROCEDURE — G8482 FLU IMMUNIZE ORDER/ADMIN: HCPCS | Performed by: INTERNAL MEDICINE

## 2017-03-07 PROCEDURE — G8419 CALC BMI OUT NRM PARAM NOF/U: HCPCS | Performed by: INTERNAL MEDICINE

## 2017-03-07 PROCEDURE — 1100F PTFALLS ASSESS-DOCD GE2>/YR: CPT | Performed by: INTERNAL MEDICINE

## 2017-03-07 PROCEDURE — 3017F COLORECTAL CA SCREEN DOC REV: CPT | Mod: 8P | Performed by: INTERNAL MEDICINE

## 2017-03-07 PROCEDURE — 4040F PNEUMOC VAC/ADMIN/RCVD: CPT | Mod: 8P | Performed by: INTERNAL MEDICINE

## 2017-03-07 PROCEDURE — 99214 OFFICE O/P EST MOD 30 MIN: CPT | Performed by: INTERNAL MEDICINE

## 2017-03-07 PROCEDURE — 1036F TOBACCO NON-USER: CPT | Performed by: INTERNAL MEDICINE

## 2017-03-07 PROCEDURE — 0518F FALL PLAN OF CARE DOCD: CPT | Mod: 8P | Performed by: INTERNAL MEDICINE

## 2017-03-07 PROCEDURE — G8432 DEP SCR NOT DOC, RNG: HCPCS | Performed by: INTERNAL MEDICINE

## 2017-03-07 RX ORDER — CIPROFLOXACIN HYDROCHLORIDE 3.5 MG/ML
SOLUTION/ DROPS TOPICAL
COMMUNITY
End: 2017-07-26

## 2017-03-07 RX ORDER — DUREZOL 0.5 MG/ML
EMULSION OPHTHALMIC
COMMUNITY
End: 2017-07-26

## 2017-03-07 NOTE — Clinical Note
Stephen Enamorado M.D.  [facility]   [address] [place] [address] [place]  [place] [address]  Phone: [phone] - Fax: [phone]           Encounter Date: 3/7/2017  Provider: Stephen Enamorado M.D.  Location of Care: [facility]      Patient:   Leanna Campbell   MR Number: [id number]   YOB: 1940     PROGRESS NOTE:  Chief Complaint   Patient presents with   • Follow-Up     Results appointment       HPI:  This patient is a 77 y.o. Female who presents for management of COPD. She is a former patient of [facility]'s with  PFTs showing  COPD with FEV1: 1 L, or 46% predicted. She quit smoking in 2013 with 50 pack years estimated. Her shortness of breath has gradually worsened over the past year. She is short of breath with ADLs. She has chronic productive cough and intermittent wheezing. Denies hemoptysis, chest pain, or edema. She is compliant with Symbicort 160 µg, Spiriva and theophylline. She is oxygen dependent at 3 L/m. She has a poor appetite, has lost weight, with a BMI:17. She denies AECOPD over the past year. She is up-to-date on influenza vaccine.     Her daughters were in the medical field, have witnessed apneas while she sleeps. She is unaware of symptoms however has nonrestorative sleep.    Her sleep study demonstrated the presence of obstructive sleep apnea with respiratory disturbance index of 15.4 events per hour rising to 41.9 events per hour during REM sleep. She had evidence of ongoing oxygen desaturation that required supplemental oxygen at 3 L/m in addition to CPAP at 7 cm of water pressure to correct her respiratory events and hypoxemia. Repeat pulmonary function studies demonstrate worsening of her status with an FEV1 now at 0.68 L which is 34% of predicted. Her FEV1 FVC ratio was 45%. There was an 8% improvement after administration of a bronchodilator. Lung biopsies demonstrated hyperinflation. Gas exchange is estimated by  DLCO was reduced at 59% predicted. She has severe obstructive lung disease.    She is quite frail and has difficulty with oxygen tanks. She does sometimes travels without her supplemental oxygen because she cannot maneuver with the heavy oxygen cylinder. She would be a candidate for a portable oxygen concentrator.    Past Medical History   Diagnosis Date   • COPD    • GERD (gastroesophageal reflux disease)    • Bowel obstruction (CMS-HCC)    • On home oxygen therapy      2 liters   • Arthritis      knee, back   • Urinary bladder disorder 11/29/10     UTI, treated with antibiotics   • Heart burn    • Indigestion    • Other specified disorder of intestines      constipation   • Dental disorder      upper and lower dentures   • Breath shortness      uses oxygen 1.5 liters at noc and prn   • Anesthesia      PONV   • Cancer (CMS-HCC) 1998     right breast   • Carcinoma (CMS-HCC) 2002 and 2013     Skin cancer   • Back pain    • Bronchitis    • Breast cancer (CMS-HCC)    • Osteoporosis    • Pneumonia        ROS:   Constitutional: Denies fevers, chills, night sweats. Has had weight loss over the past several years  Eyes: Denies vision loss, pain, drainage, double vision  Ears, Nose, Throat: Denies earache, tinnitus, hoarseness  Cardiovascular: Denies chest pain, tightness, palpitations currently  Respiratory: See history of present illness  Sleep: See history of present illness  GI: Denies abdominal pain, nausea, vomiting, diarrhea  : Denies frequent urination, hematuria, painful urination  Musculoskeletal: Denies back pain, painful joints, sore muscles  Neurological: Denies headaches, seizures  Skin: Denies rashes, color changes  Psychiatric: Denies depression or thoughts of suicide  Hematologic: Denies bleeding tendency or clotting tendency  Allergic/Immunologic: Denies rhinitis, skin sensitivity    Social History     Social History   • Marital Status: Single     Spouse Name: N/A   • Number of Children: N/A   • Years of  Education: N/A     Occupational History   • Not on file.     Social History Main Topics   • Smoking status: Former Smoker -- 1.00 packs/day for 50 years     Types: Cigarettes     Quit date: 02/01/2013   • Smokeless tobacco: Former User     Quit date: 02/01/2013   • Alcohol Use: Yes      Comment: Glass of wine 4x/week   • Drug Use: No   • Sexual Activity: Not on file     Other Topics Concern   • Not on file     Social History Narrative     Darvon; Pcn; and Ciprofloxacin  Current Outpatient Prescriptions on File Prior to Visit   Medication Sig Dispense Refill   • ipratropium-albuterol (COMBIVENT RESPIMAT)  MCG/ACT Aero Soln Inhale 1 Puff by mouth 4 times a day.     • tiotropium (SPIRIVA HANDIHALER) 18 MCG Cap Inhale 18 mcg by mouth every day.     • ipratropium-albuterol (DUONEB) 0.5-2.5 (3) MG/3ML nebulizer solution 3 mL by Nebulization route 4 times a day.     • sucralfate (CARAFATE) 1 GM Tab Take 1 g by mouth 4 Times a Day,Before Meals and at Bedtime.     • THEOPHYLLINE ER PO Take 200 mg by mouth 2 Times a Day.     • digoxin (LANOXIN) 125 MCG Tab Take 125 mcg by mouth every day. TAKE 1 TABLET BY MOUTH DAILY  3   • SYMBICORT 160-4.5 MCG/ACT Aerosol INHALE 2 PUFFS BY MOUTH TWICE DAILY. RINSE MOUTH AFTER USE.  3   • guaifenesin LA (MUCINEX) 600 MG TABLET SR 12 HR Take 600 mg by mouth every 12 hours.     • aspirin (ASA) 325 MG TABS Take 325 mg by mouth every day.    Indications: Blood Clot     • zolpidem (AMBIEN) 5 MG Tab Take 1-2 tablets by mouth every evening as needed for insomnia. Bring to sleep study. (Patient not taking: Reported on 3/7/2017) 3 Tab 0   • ferrous sulfate (FEOSOL) 325 (65 FE) MG tablet Take 325 mg by mouth 2 Times a Day.       • famotidine (PEPCID) 20 MG TABS Take 20 mg by mouth 2 times a day.    Indications: Gastroesophageal Reflux Disease     • hydrocodone-acetaminophen (NORCO) 5-325 MG TABS per tablet Take 1-2 Tabs by mouth every 3 hours as needed.    Indications: Moderate to Moderately  "Severe Pain     • Multiple Vitamins-Iron (MULTIVITAMIN/IRON) TABS Take 1 Tab by mouth every day.       • calcium carbonate (TUMS) 500 MG CHEW Take 1 Tab by mouth 2 times a day.     • Cholecalciferol (VITAMIN D) 400 UNIT TABS Take 1 Tab by mouth 2 Times a Day.       No current facility-administered medications on file prior to visit.     Blood pressure 90/70, pulse 108, temperature 36.8 °C (98.2 °F), resp. rate 16, height 1.577 m (5' 2.09\"), weight 41.277 kg (91 lb), SpO2 96 %.  Family History   Problem Relation Age of Onset   • Cancer         Physical Exam:  Thin, elderly, frail. No acute distress on supplemental oxygen.  HEENT: PERRLA, EOMI, no scleral icterus, no nasal or oral lesions  Neck: No thyromegaly, no adenopathy, no bruits  Mallampatti: Grade II  Lungs: Distant breath sounds, no wheezes or crackles  Heart: Regular rate and rhythm, no gallops or murmurs  Abdomen: Soft, benign, no organomegaly  Extremities: No clubbing, cyanosis, or edema  Neurologic: Cranial nerve, motor, and sensory exam are normal    1. Chronic obstructive pulmonary disease, unspecified COPD type (CMS-HCC)    2. Chronic respiratory failure with hypoxia (CMS-HCC)    3. LEXI (obstructive sleep apnea)        She has very severe obstructive lung disease with a positive response to inhaled bronchodilators. She will continue Symbicort, Spiriva, DuoNeb and a rescue inhaler.  She'll continue supplemental oxygen.  We will see if she qualifies for portable oxygen concentrator. Certainly she has difficulty with her oxygen cylinder.  We will initiate CPAP at 7 cm of water pressure. She will require supplemental oxygen at 3 L/m at bedtime  We will see her back in 6 weeks to assess efficacy and compliance.        Electronically signed by Stephen Enamorado M.D.  on 03/07/2017    No Recipients                    "

## 2017-03-07 NOTE — MR AVS SNAPSHOT
"Leanan Larkin Campbell   3/7/2017 11:20 AM   Office Visit   MRN: [id number]    Department:  [facility]   Dept Phone:  [phone]    Description:  Female : 1940   Provider:  Stephen Enamorado M.D.           Reason for Visit     Follow-Up Results appointment      Allergies as of 3/7/2017     Allergen Noted Reactions    Darvon [Propoxyphene Hcl] 2010       Pcn [Penicillins] 2010   Rash    Ciprofloxacin 2010   Rash, Itching    Pt complained of itch on hands, mouth and was red in the face after 2100 dose       Vital Signs     Blood Pressure Pulse Temperature Respirations Height Weight    90/70 mmHg 108 36.8 °C (98.2 °F) 16 1.577 m (5' 2.09\") 41.277 kg (91 lb)    Body Mass Index Oxygen Saturation Smoking Status             16.60 kg/m2 96% Former Smoker         Basic Information     Date Of Birth Sex Race Ethnicity Preferred Language    1940 Female White Non- English      Problem List              ICD-10-CM Priority Class Noted - Resolved    Pulmonary insufficiency following trauma and surgery 518.5   2010 - Present    BILE LEAK    2010 - Present    COPD (chronic obstructive pulmonary disease) (CMS-HCC) J44.9 Medium  2010 - Present    Physical deconditioning R53.81   2010 - Present    Alcohol abuse F10.10   2013 - Present    Hip fracture (CMS-HCC) S72.009A High  2013 - Present    Bronchitis, acute J20.9 Medium  2013 - Present    COPD exacerbation (CMS-HCC) J44.1 Medium  2013 - Present    Anemia D64.9   2013 - Present    GERD (gastroesophageal reflux disease) K21.9 Low Chronic 2013 - Present      Health Maintenance        Date Due Completion Dates    IMM DTaP/Tdap/Td Vaccine (1 - Tdap) 1961 ---    PAP SMEAR 1963 ---    COLONOSCOPY 1992 ---    IMM ZOSTER VACCINE 2002 ---    BONE DENSITY 2007 ---    IMM PNEUMOCOCCAL 65+ (ADULT) LOW/MEDIUM RISK SERIES (1 of 2 - PCV13) 2007 ---    MAMMOGRAM 2008 " 9/28/2007, 9/28/2007            Current Immunizations     Influenza TIV (IM) 11/1/2016, 10/1/2012    Influenza Vaccine 9/13/2010    Pneumococcal Vaccine (UF)Historical Data 10/1/2009      Below and/or attached are the medications your provider expects you to take. Review all of your home medications and newly ordered medications with your provider and/or pharmacist. Follow medication instructions as directed by your provider and/or pharmacist. Please keep your medication list with you and share with your provider. Update the information when medications are discontinued, doses are changed, or new medications (including over-the-counter products) are added; and carry medication information at all times in the event of emergency situations     Allergies:  DARVON - (reactions not documented)     PCN - Rash     CIPROFLOXACIN - Rash,Itching               Medications  Valid as of: March 07, 2017 - 11:54 AM    Generic Name Brand Name Tablet Size Instructions for use    Aspirin (Tab)  MG Take 325 mg by mouth every day.    Indications: Blood Clot        Budesonide-Formoterol Fumarate (Aerosol) SYMBICORT 160-4.5 MCG/ACT INHALE 2 PUFFS BY MOUTH TWICE DAILY. RINSE MOUTH AFTER USE.        Calcium Carbonate Antacid (Chew Tab) TUMS 500 MG Take 1 Tab by mouth 2 times a day.        Cholecalciferol (Tab) VITAMIN D 400 UNIT Take 1 Tab by mouth 2 Times a Day.        Ciprofloxacin HCl (Solution) CILOXIN 0.3 %         Difluprednate (Emulsion) DUREZOL 0.05 %         Digoxin (Tab) LANOXIN 125 MCG Take 125 mcg by mouth every day. TAKE 1 TABLET BY MOUTH DAILY        Famotidine (Tab) PEPCID 20 MG Take 20 mg by mouth 2 times a day.    Indications: Gastroesophageal Reflux Disease        Ferrous Sulfate (Tab) ferrous sulfate 325 (65 FE) MG Take 325 mg by mouth 2 Times a Day.          GuaiFENesin (TABLET SR 12 HR) MUCINEX 600 MG Take 600 mg by mouth every 12 hours.        Hydrocodone-Acetaminophen (Tab) NORCO 5-325 MG Take 1-2 Tabs by  mouth every 3 hours as needed.    Indications: Moderate to Moderately Severe Pain        Ipratropium-Albuterol (Aero Soln) COMBIVENT RESPIMAT  MCG/ACT Inhale 1 Puff by mouth 4 times a day.        Ipratropium-Albuterol (Solution) DUONEB 0.5-2.5 (3) MG/3ML 3 mL by Nebulization route 4 times a day.        Multiple Vitamins-Iron (Tab) Multivitamin/Iron  Take 1 Tab by mouth every day.          Sucralfate (Tab) CARAFATE 1 GM Take 1 g by mouth 4 Times a Day,Before Meals and at Bedtime.        Theophylline   Take 200 mg by mouth 2 Times a Day.        Tiotropium Bromide Monohydrate (Cap) SPIRIVA 18 MCG Inhale 18 mcg by mouth every day.        Zolpidem Tartrate (Tab) AMBIEN 5 MG Take 1-2 tablets by mouth every evening as needed for insomnia. Bring to sleep study.        .                 Medicines prescribed today were sent to:     [facility]Y #[id number] - [place], [place] - [address]    [address] [place] [id number]    Phone: [phone] Fax: [phone]    Open 24 Hours?: No    D[facility]    [address] [facility] [address] [place] [address]    Phone: [phone] Fax: [phone]      Medication refill instructions:       If your prescription bottle indicates you have medication refills left, it is not necessary to call your provider’s office. Please contact your pharmacy and they will refill your medication.    If your prescription bottle indicates you do not have any refills left, you may request refills at any time through one of the following ways: The online [facility] system (except Urgent Care), by calling your provider’s office, or by asking your pharmacy to contact your provider’s office with a refill request. Medication refills are processed only during regular business hours and may not be available until the next business day. Your provider may request additional information or to have a follow-up visit with you prior to refilling your medication.   *Please Note: Medication refills are assigned a new Rx number  when refilled electronically. Your pharmacy may indicate that no refills were authorized even though a new prescription for the same medication is available at the pharmacy. Please request the medicine by name with the pharmacy before contacting your provider for a refill.           MyChart Status: Patient Declined

## 2017-03-08 NOTE — PROGRESS NOTES
Chief Complaint   Patient presents with   • Follow-Up     Results appointment       HPI:  This patient is a 77 y.o. Female who presents for management of COPD. She is a former patient of [facility] with  PFTs showing  COPD with FEV1: 1 L, or 46% predicted. She quit smoking in 2013 with 50 pack years estimated. Her shortness of breath has gradually worsened over the past year. She is short of breath with ADLs. She has chronic productive cough and intermittent wheezing. Denies hemoptysis, chest pain, or edema. She is compliant with Symbicort 160 µg, Spiriva and theophylline. She is oxygen dependent at 3 L/m. She has a poor appetite, has lost weight, with a BMI:17. She denies AECOPD over the past year. She is up-to-date on influenza vaccine.     Her daughters were in the medical field, have witnessed apneas while she sleeps. She is unaware of symptoms however has nonrestorative sleep.    Her sleep study demonstrated the presence of obstructive sleep apnea with respiratory disturbance index of 15.4 events per hour rising to 41.9 events per hour during REM sleep. She had evidence of ongoing oxygen desaturation that required supplemental oxygen at 3 L/m in addition to CPAP at 7 cm of water pressure to correct her respiratory events and hypoxemia. Repeat pulmonary function studies demonstrate worsening of her status with an FEV1 now at 0.68 L which is 34% of predicted. Her FEV1 FVC ratio was 45%. There was an 8% improvement after administration of a bronchodilator. Lung biopsies demonstrated hyperinflation. Gas exchange is estimated by DLCO was reduced at 59% predicted. She has severe obstructive lung disease.    She is quite frail and has difficulty with oxygen tanks. She does sometimes travels without her supplemental oxygen because she cannot maneuver with the heavy oxygen cylinder. She would be a candidate for a portable oxygen concentrator.    Past Medical History   Diagnosis Date   • COPD    • GERD (gastroesophageal  reflux disease)    • Bowel obstruction (CMS-HCC)    • On home oxygen therapy      2 liters   • Arthritis      knee, back   • Urinary bladder disorder 11/29/10     UTI, treated with antibiotics   • Heart burn    • Indigestion    • Other specified disorder of intestines      constipation   • Dental disorder      upper and lower dentures   • Breath shortness      uses oxygen 1.5 liters at noc and prn   • Anesthesia      PONV   • Cancer (CMS-HCC) 1998     right breast   • Carcinoma (CMS-HCC) 2002 and 2013     Skin cancer   • Back pain    • Bronchitis    • Breast cancer (CMS-HCC)    • Osteoporosis    • Pneumonia        ROS:   Constitutional: Denies fevers, chills, night sweats. Has had weight loss over the past several years  Eyes: Denies vision loss, pain, drainage, double vision  Ears, Nose, Throat: Denies earache, tinnitus, hoarseness  Cardiovascular: Denies chest pain, tightness, palpitations currently  Respiratory: See history of present illness  Sleep: See history of present illness  GI: Denies abdominal pain, nausea, vomiting, diarrhea  : Denies frequent urination, hematuria, painful urination  Musculoskeletal: Denies back pain, painful joints, sore muscles  Neurological: Denies headaches, seizures  Skin: Denies rashes, color changes  Psychiatric: Denies depression or thoughts of suicide  Hematologic: Denies bleeding tendency or clotting tendency  Allergic/Immunologic: Denies rhinitis, skin sensitivity    Social History     Social History   • Marital Status: Single     Spouse Name: N/A   • Number of Children: N/A   • Years of Education: N/A     Occupational History   • Not on file.     Social History Main Topics   • Smoking status: Former Smoker -- 1.00 packs/day for 50 years     Types: Cigarettes     Quit date: 02/01/2013   • Smokeless tobacco: Former User     Quit date: 02/01/2013   • Alcohol Use: Yes      Comment: Glass of wine 4x/week   • Drug Use: No   • Sexual Activity: Not on file     Other Topics Concern    • Not on file     Social History Narrative     Darvon; Pcn; and Ciprofloxacin  Current Outpatient Prescriptions on File Prior to Visit   Medication Sig Dispense Refill   • ipratropium-albuterol (COMBIVENT RESPIMAT)  MCG/ACT Aero Soln Inhale 1 Puff by mouth 4 times a day.     • tiotropium (SPIRIVA HANDIHALER) 18 MCG Cap Inhale 18 mcg by mouth every day.     • ipratropium-albuterol (DUONEB) 0.5-2.5 (3) MG/3ML nebulizer solution 3 mL by Nebulization route 4 times a day.     • sucralfate (CARAFATE) 1 GM Tab Take 1 g by mouth 4 Times a Day,Before Meals and at Bedtime.     • THEOPHYLLINE ER PO Take 200 mg by mouth 2 Times a Day.     • digoxin (LANOXIN) 125 MCG Tab Take 125 mcg by mouth every day. TAKE 1 TABLET BY MOUTH DAILY  3   • SYMBICORT 160-4.5 MCG/ACT Aerosol INHALE 2 PUFFS BY MOUTH TWICE DAILY. RINSE MOUTH AFTER USE.  3   • guaifenesin LA (MUCINEX) 600 MG TABLET SR 12 HR Take 600 mg by mouth every 12 hours.     • aspirin (ASA) 325 MG TABS Take 325 mg by mouth every day.    Indications: Blood Clot     • zolpidem (AMBIEN) 5 MG Tab Take 1-2 tablets by mouth every evening as needed for insomnia. Bring to sleep study. (Patient not taking: Reported on 3/7/2017) 3 Tab 0   • ferrous sulfate (FEOSOL) 325 (65 FE) MG tablet Take 325 mg by mouth 2 Times a Day.       • famotidine (PEPCID) 20 MG TABS Take 20 mg by mouth 2 times a day.    Indications: Gastroesophageal Reflux Disease     • hydrocodone-acetaminophen (NORCO) 5-325 MG TABS per tablet Take 1-2 Tabs by mouth every 3 hours as needed.    Indications: Moderate to Moderately Severe Pain     • Multiple Vitamins-Iron (MULTIVITAMIN/IRON) TABS Take 1 Tab by mouth every day.       • calcium carbonate (TUMS) 500 MG CHEW Take 1 Tab by mouth 2 times a day.     • Cholecalciferol (VITAMIN D) 400 UNIT TABS Take 1 Tab by mouth 2 Times a Day.       No current facility-administered medications on file prior to visit.     Blood pressure 90/70, pulse 108, temperature 36.8 °C (98.2  "°F), resp. rate 16, height 1.577 m (5' 2.09\"), weight 41.277 kg (91 lb), SpO2 96 %.  Family History   Problem Relation Age of Onset   • Cancer         Physical Exam:  Thin, elderly, frail. No acute distress on supplemental oxygen.  HEENT: PERRLA, EOMI, no scleral icterus, no nasal or oral lesions  Neck: No thyromegaly, no adenopathy, no bruits  Mallampatti: Grade II  Lungs: Distant breath sounds, no wheezes or crackles  Heart: Regular rate and rhythm, no gallops or murmurs  Abdomen: Soft, benign, no organomegaly  Extremities: No clubbing, cyanosis, or edema  Neurologic: Cranial nerve, motor, and sensory exam are normal    1. Chronic obstructive pulmonary disease, unspecified COPD type (CMS-HCC)    2. Chronic respiratory failure with hypoxia (CMS-HCC)    3. LEXI (obstructive sleep apnea)        She has very severe obstructive lung disease with a positive response to inhaled bronchodilators. She will continue Symbicort, Spiriva, DuoNeb and a rescue inhaler.  She'll continue supplemental oxygen.  We will see if she qualifies for portable oxygen concentrator. Certainly she has difficulty with her oxygen cylinder.  We will initiate CPAP at 7 cm of water pressure. She will require supplemental oxygen at 3 L/m at bedtime  We will see her back in 6 weeks to assess efficacy and compliance.    "

## 2017-03-13 PROCEDURE — 95810 POLYSOM 6/> YRS 4/> PARAM: CPT | Performed by: INTERNAL MEDICINE

## 2017-04-19 ENCOUNTER — SLEEP CENTER VISIT (OUTPATIENT)
Dept: SLEEP MEDICINE | Facility: MEDICAL CENTER | Age: 77
End: 2017-04-19
Payer: MEDICARE

## 2017-04-19 VITALS
HEART RATE: 87 BPM | DIASTOLIC BLOOD PRESSURE: 82 MMHG | SYSTOLIC BLOOD PRESSURE: 104 MMHG | BODY MASS INDEX: 16.93 KG/M2 | RESPIRATION RATE: 16 BRPM | WEIGHT: 92 LBS | HEIGHT: 62 IN | OXYGEN SATURATION: 99 %

## 2017-04-19 DIAGNOSIS — J44.9 CHRONIC OBSTRUCTIVE PULMONARY DISEASE, UNSPECIFIED COPD TYPE (HCC): ICD-10-CM

## 2017-04-19 DIAGNOSIS — R53.81 PHYSICAL DECONDITIONING: ICD-10-CM

## 2017-04-19 DIAGNOSIS — G47.33 OSA (OBSTRUCTIVE SLEEP APNEA): ICD-10-CM

## 2017-04-19 DIAGNOSIS — J96.11 CHRONIC RESPIRATORY FAILURE WITH HYPOXIA (HCC): ICD-10-CM

## 2017-04-19 PROCEDURE — 99213 OFFICE O/P EST LOW 20 MIN: CPT | Performed by: INTERNAL MEDICINE

## 2017-04-19 NOTE — PROGRESS NOTES
Chief Complaint   Patient presents with   • Follow-Up     6 week FV     This patient is a 77 y.o. Female who returns for severe COPD and LEXI. She quit smoking in 2013 with 50 pack years estimated. Her shortness of breath has gradually worsened over the past year. She is short of breath with ADLs. She has chronic productive cough and intermittent wheezing. Denies hemoptysis, chest pain, or edema. She is compliant with Symbicort 160 µg, Spiriva and theophylline. She is oxygen dependent at 3 L/m. She has a poor appetite, has lost weight, with a BMI:17. She denies AECOPD over the past year. She is up-to-date on influenza vaccine.  Chest CAT scan in January 2017 showed central lobular emphysema, and stable 3 mm right middle lobe nodule from July 2016.  Her sleep study demonstrated the presence of obstructive sleep apnea with respiratory disturbance index of 15.4 events per hour rising to 41.9 events per hour during REM sleep. She had evidence of ongoing oxygen desaturation that required supplemental oxygen at 3 L/m in addition to CPAP at 7 cm of water pressure to correct her respiratory events and hypoxemia. She had CPAP ordered however has not yet been set up through her DME.   Repeat pulmonary function studies demonstrate worsening of her status with an FEV1 at 0.68 L which is 34% of predicted. Her FEV1 FVC ratio was 45%. There was an 8% improvement after administration of a bronchodilator.     Past Medical History   Diagnosis Date   • COPD    • GERD (gastroesophageal reflux disease)    • Bowel obstruction (CMS-HCC)    • On home oxygen therapy      2 liters   • Arthritis      knee, back   • Urinary bladder disorder 11/29/10     UTI, treated with antibiotics   • Heart burn    • Indigestion    • Other specified disorder of intestines      constipation   • Dental disorder      upper and lower dentures   • Breath shortness      uses oxygen 1.5 liters at noc and prn   • Anesthesia      PONV   • Cancer (CMS-HCC) 1998      right breast   • Carcinoma (CMS-[facility]) 2002 and 2013     Skin cancer   • Back pain    • Bronchitis    • Breast cancer (CMS-HCC)    • Osteoporosis    • Pneumonia        Social History     Social History   • Marital Status: Single     Spouse Name: N/A   • Number of Children: N/A   • Years of Education: N/A     Occupational History   • Not on file.     Social History Main Topics   • Smoking status: Former Smoker -- 1.00 packs/day for 50 years     Types: Cigarettes     Quit date: 02/01/2013   • Smokeless tobacco: Former User     Quit date: 02/01/2013   • Alcohol Use: Yes      Comment: Glass of wine 4x/week   • Drug Use: No   • Sexual Activity: Not on file     Other Topics Concern   • Not on file     Social History Narrative       Family History   Problem Relation Age of Onset   • Cancer         Current Outpatient Prescriptions on File Prior to Visit   Medication Sig Dispense Refill   • ipratropium-albuterol (COMBIVENT RESPIMAT)  MCG/ACT Aero Soln Inhale 1 Puff by mouth 4 times a day.     • tiotropium (SPIRIVA HANDIHALER) 18 MCG Cap Inhale 18 mcg by mouth every day.     • ipratropium-albuterol (DUONEB) 0.5-2.5 (3) MG/3ML nebulizer solution 3 mL by Nebulization route 4 times a day.     • sucralfate (CARAFATE) 1 GM Tab Take 1 g by mouth 4 Times a Day,Before Meals and at Bedtime.     • THEOPHYLLINE ER PO Take 200 mg by mouth 2 Times a Day.     • digoxin (LANOXIN) 125 MCG Tab Take 125 mcg by mouth every day. TAKE 1 TABLET BY MOUTH DAILY  3   • SYMBICORT 160-4.5 MCG/ACT Aerosol INHALE 2 PUFFS BY MOUTH TWICE DAILY. RINSE MOUTH AFTER USE.  3   • guaifenesin LA (MUCINEX) 600 MG TABLET SR 12 HR Take 600 mg by mouth every 12 hours.     • ciprofloxacin (CILOXIN) 0.3 % Solution      • DUREZOL 0.05 % Emulsion      • zolpidem (AMBIEN) 5 MG Tab Take 1-2 tablets by mouth every evening as needed for insomnia. Bring to sleep study. (Patient not taking: Reported on 3/7/2017) 3 Tab 0   • aspirin (ASA) 325 MG TABS Take 325 mg by mouth  "every day.    Indications: Blood Clot     • ferrous sulfate (FEOSOL) 325 (65 FE) MG tablet Take 325 mg by mouth 2 Times a Day.       • famotidine (PEPCID) 20 MG TABS Take 20 mg by mouth 2 times a day.    Indications: Gastroesophageal Reflux Disease     • hydrocodone-acetaminophen (NORCO) 5-325 MG TABS per tablet Take 1-2 Tabs by mouth every 3 hours as needed.    Indications: Moderate to Moderately Severe Pain     • Multiple Vitamins-Iron (MULTIVITAMIN/IRON) TABS Take 1 Tab by mouth every day.       • calcium carbonate (TUMS) 500 MG CHEW Take 1 Tab by mouth 2 times a day.     • Cholecalciferol (VITAMIN D) 400 UNIT TABS Take 1 Tab by mouth 2 Times a Day.       No current facility-administered medications on file prior to visit.       Allergies: Darvon; Pcn; and Ciprofloxacin    ROS:   Constitutional: Denies fevers, chills, night sweats, fatigue or weight loss  Eyes: Denies vision loss, pain, drainage, double vision  Ears, Nose, Throat: Denies earache, difficulty hearing, tinnitus, nasal congestion, hoarseness  Cardiovascular: Denies chest pain, tightness, palpitations, orthopnea or edema  Respiratory: As in history of present illness  Sleep: +daytime sleepiness, snoring, apneas, denies insomnia, morning headaches  GI: Denies heartburn, dysphagia, nausea, abdominal pain, diarrhea or constipation  : Denies frequent urination, hematuria, discharge or painful urination  Musculoskeletal: Denies back pain, painful joints, sore muscles  Neurological: Denies weakness or headaches  Skin: No rashes    Blood pressure 104/82, pulse 87, resp. rate 16, height 1.575 m (5' 2.01\"), weight 41.731 kg (92 lb), SpO2 99 %.  Multi-Ox Readings  Multi Ox #1     O2 sat % at rest     O2 sat % on exertion     O2 sat average on exertion     Multi Ox #2     O2 sat % at rest     O2 sat % on exertion     O2 sat average on exertion       Oxygen Use 3   Oxygen Frequency 24/7   Duration of need     Is the patient mobile within the home?     CPAP Use? "     BIPAP Use?     Servo Titration         Physical Exam:  Appearance: frail, in no acute distress  HEENT: Normocephalic, atraumatic, white sclera, PERRLA, oropharynx clear  Neck: No adenopathy or masses  Respiratory: no intercostal retractions or accessory muscle use  Lungs auscultation: Clear to auscultation bilaterally, diminished breath sounds  Cardiovascular: Regular rate rhythm. No murmurs, rubs or gallops.  No LE edema  Abdomen: soft, scaphoid  Gait: Normal  Digits: No clubbing, cyanosis  Motor: No focal deficits  Orientation: Oriented to time, person and place    Diagnosis:  1. Chronic obstructive pulmonary disease, unspecified COPD type (CMS-HCC)  OT PULMOMARY REHAB   2. Chronic respiratory failure with hypoxia (CMS-HCC)     3. LEXI (obstructive sleep apnea)     4. Physical deconditioning         Plan:  Refer to pulmonary rehabilitation for treatment of COPD. Continue Symbicort, Spiriva, theophylline, oxygen at 3 L/m 24/7.  We will set her up with a new DME for portable oxygen concentrator and CPAP: 7 cm water.  Return in about 8 weeks (around 6/14/2017).

## 2017-04-19 NOTE — MR AVS SNAPSHOT
"Leanna Campbell   2017 11:00 AM   Sleep Center Visit   MRN: [id number]    Department:  [facility]   Dept Phone:  [phone]    Description:  Female : 1940   Provider:  Nita Kent M.D.           Reason for Visit     Follow-Up 6 week [facility]      Allergies as of 2017     Allergen Noted Reactions    Darvon [Propoxyphene Hcl] 2010       Pcn [Penicillins] 2010   Rash    Ciprofloxacin 2010   Rash, Itching    Pt complained of itch on hands, mouth and was red in the face after 2100 dose       You were diagnosed with     Chronic obstructive pulmonary disease, unspecified COPD type (CMS-HCC)   [0977547]       Chronic respiratory failure with hypoxia (CMS-HCC)   [957428]       LEXI (obstructive sleep apnea)   [706146]       Physical deconditioning   [383635]         Vital Signs     Blood Pressure Pulse Respirations Height Weight Body Mass Index    104/82 mmHg 87 16 1.575 m (5' 2.01\") 41.731 kg (92 lb) 16.82 kg/m2    Oxygen Saturation Smoking Status                99% Former Smoker          Basic Information     Date Of Birth Sex Race Ethnicity Preferred Language    1940 Female White Non- English      Your appointments     2017 11:40 AM   Follow UP with MARY ANN Joe   [facility] (--)    [address] [place] [id number]   [phone]              Problem List              ICD-10-CM Priority Class Noted - Resolved    Pulmonary insufficiency following trauma and surgery 518.5   2010 - Present    BILE LEAK    2010 - Present    COPD (chronic obstructive pulmonary disease) (CMS-HCC) J44.9 Medium  2010 - Present    Physical deconditioning R53.81   2010 - Present    Alcohol abuse F10.10   2013 - Present    Hip fracture (CMS-HCC) S72.009A High  2013 - Present    Bronchitis, acute J20.9 Medium  2013 - Present    COPD exacerbation (CMS-HCC) J44.1 Medium  2013 - Present  "    Anemia D64.9   2/14/2013 - Present    GERD (gastroesophageal reflux disease) K21.9 Low Chronic 2/14/2013 - Present      Health Maintenance        Date Due Completion Dates    IMM DTaP/Tdap/Td Vaccine (1 - Tdap) 1/16/1961 ---    PAP SMEAR 1/16/1963 ---    COLONOSCOPY 1/16/1992 ---    IMM ZOSTER VACCINE 1/16/2002 ---    BONE DENSITY 1/16/2007 ---    IMM PNEUMOCOCCAL 65+ (ADULT) LOW/MEDIUM RISK SERIES (1 of 2 - PCV13) 1/16/2007 ---    MAMMOGRAM 9/28/2008 9/28/2007, 9/28/2007            Current Immunizations     Influenza TIV (IM) 11/1/2016, 10/1/2012    Influenza Vaccine 9/13/2010    Pneumococcal Vaccine (UF)Historical Data 10/1/2009      Below and/or attached are the medications your provider expects you to take. Review all of your home medications and newly ordered medications with your provider and/or pharmacist. Follow medication instructions as directed by your provider and/or pharmacist. Please keep your medication list with you and share with your provider. Update the information when medications are discontinued, doses are changed, or new medications (including over-the-counter products) are added; and carry medication information at all times in the event of emergency situations     Allergies:  DARVON - (reactions not documented)     PCN - Rash     CIPROFLOXACIN - Rash,Itching               Medications  Valid as of: April 19, 2017 - 11:34 AM    Generic Name Brand Name Tablet Size Instructions for use    Aspirin (Tab)  MG Take 325 mg by mouth every day.    Indications: Blood Clot        Budesonide-Formoterol Fumarate (Aerosol) SYMBICORT 160-4.5 MCG/ACT INHALE 2 PUFFS BY MOUTH TWICE DAILY. RINSE MOUTH AFTER USE.        Calcium Carbonate Antacid (Chew Tab) TUMS 500 MG Take 1 Tab by mouth 2 times a day.        Cholecalciferol (Tab) VITAMIN D 400 UNIT Take 1 Tab by mouth 2 Times a Day.        Ciprofloxacin HCl (Solution) CILOXIN 0.3 %         Difluprednate (Emulsion) DUREZOL 0.05 %         Digoxin (Tab)  LANOXIN 125 MCG Take 125 mcg by mouth every day. TAKE 1 TABLET BY MOUTH DAILY        Famotidine (Tab) PEPCID 20 MG Take 20 mg by mouth 2 times a day.    Indications: Gastroesophageal Reflux Disease        Ferrous Sulfate (Tab) ferrous sulfate 325 (65 FE) MG Take 325 mg by mouth 2 Times a Day.          GuaiFENesin (TABLET SR 12 HR) MUCINEX 600 MG Take 600 mg by mouth every 12 hours.        Hydrocodone-Acetaminophen (Tab) NORCO 5-325 MG Take 1-2 Tabs by mouth every 3 hours as needed.    Indications: Moderate to Moderately Severe Pain        Ipratropium-Albuterol (Aero Soln) COMBIVENT RESPIMAT  MCG/ACT Inhale 1 Puff by mouth 4 times a day.        Ipratropium-Albuterol (Solution) DUONEB 0.5-2.5 (3) MG/3ML 3 mL by Nebulization route 4 times a day.        Multiple Vitamins-Iron (Tab) Multivitamin/Iron  Take 1 Tab by mouth every day.          Sucralfate (Tab) CARAFATE 1 GM Take 1 g by mouth 4 Times a Day,Before Meals and at Bedtime.        Theophylline   Take 200 mg by mouth 2 Times a Day.        Tiotropium Bromide Monohydrate (Cap) SPIRIVA 18 MCG Inhale 18 mcg by mouth every day.        Zolpidem Tartrate (Tab) AMBIEN 5 MG Take 1-2 tablets by mouth every evening as needed for insomnia. Bring to sleep study.        .                 Medicines prescribed today were sent to:     [facility] #[id number] - [place], [place] - [address]    [address] [address]    Phone: [phone] Fax: [phone]    Open 24 Hours?: No    [facility]    [address] [facility] [address] [place] [address]    Phone: [phone] Fax: [phone]      Medication refill instructions:       If your prescription bottle indicates you have medication refills left, it is not necessary to call your provider’s office. Please contact your pharmacy and they will refill your medication.    If your prescription bottle indicates you do not have any refills left, you may request refills at any time through one of the following ways: The online [employer]  system (except Urgent Care), by calling your provider’s office, or by asking your pharmacy to contact your provider’s office with a refill request. Medication refills are processed only during regular business hours and may not be available until the next business day. Your provider may request additional information or to have a follow-up visit with you prior to refilling your medication.   *Please Note: Medication refills are assigned a new Rx number when refilled electronically. Your pharmacy may indicate that no refills were authorized even though a new prescription for the same medication is available at the pharmacy. Please request the medicine by name with the pharmacy before contacting your provider for a refill.           MyChart Status: Patient Declined

## 2017-05-12 ENCOUNTER — TELEPHONE (OUTPATIENT)
Dept: PULMONOLOGY | Facility: HOSPICE | Age: 77
End: 2017-05-12

## 2017-05-12 DIAGNOSIS — J44.9 CHRONIC OBSTRUCTIVE PULMONARY DISEASE, UNSPECIFIED COPD TYPE (HCC): ICD-10-CM

## 2017-06-14 ENCOUNTER — APPOINTMENT (OUTPATIENT)
Dept: SLEEP MEDICINE | Facility: MEDICAL CENTER | Age: 77
End: 2017-06-14
Payer: MEDICARE

## 2017-07-14 ENCOUNTER — HOSPITAL ENCOUNTER (OUTPATIENT)
Dept: RADIOLOGY | Facility: MEDICAL CENTER | Age: 77
End: 2017-07-14

## 2017-07-26 DIAGNOSIS — Z01.812 PRE-OPERATIVE LABORATORY EXAMINATION: ICD-10-CM

## 2017-07-26 DIAGNOSIS — Z01.810 PRE-OPERATIVE CARDIOVASCULAR EXAMINATION: ICD-10-CM

## 2017-07-26 LAB
ANION GAP SERPL CALC-SCNC: 8 MMOL/L (ref 0–11.9)
BUN SERPL-MCNC: 9 MG/DL (ref 8–22)
CALCIUM SERPL-MCNC: 9.8 MG/DL (ref 8.5–10.5)
CHLORIDE SERPL-SCNC: 98 MMOL/L (ref 96–112)
CO2 SERPL-SCNC: 28 MMOL/L (ref 20–33)
CREAT SERPL-MCNC: 0.66 MG/DL (ref 0.5–1.4)
EKG IMPRESSION: NORMAL
ERYTHROCYTE [DISTWIDTH] IN BLOOD BY AUTOMATED COUNT: 48.3 FL (ref 35.9–50)
GFR SERPL CREATININE-BSD FRML MDRD: >60 ML/MIN/1.73 M 2
GLUCOSE SERPL-MCNC: 85 MG/DL (ref 65–99)
HCT VFR BLD AUTO: 47.3 % (ref 37–47)
HGB BLD-MCNC: 15.3 G/DL (ref 12–16)
MCH RBC QN AUTO: 29.7 PG (ref 27–33)
MCHC RBC AUTO-ENTMCNC: 32.3 G/DL (ref 33.6–35)
MCV RBC AUTO: 91.8 FL (ref 81.4–97.8)
PLATELET # BLD AUTO: 334 K/UL (ref 164–446)
PMV BLD AUTO: 9.5 FL (ref 9–12.9)
POTASSIUM SERPL-SCNC: 4.2 MMOL/L (ref 3.6–5.5)
RBC # BLD AUTO: 5.15 M/UL (ref 4.2–5.4)
SODIUM SERPL-SCNC: 134 MMOL/L (ref 135–145)
WBC # BLD AUTO: 7.8 K/UL (ref 4.8–10.8)

## 2017-07-26 PROCEDURE — 85027 COMPLETE CBC AUTOMATED: CPT

## 2017-07-26 PROCEDURE — 80048 BASIC METABOLIC PNL TOTAL CA: CPT

## 2017-07-26 PROCEDURE — 36415 COLL VENOUS BLD VENIPUNCTURE: CPT

## 2017-07-26 PROCEDURE — 93005 ELECTROCARDIOGRAM TRACING: CPT

## 2017-07-26 PROCEDURE — 93010 ELECTROCARDIOGRAM REPORT: CPT | Performed by: INTERNAL MEDICINE

## 2017-08-01 ENCOUNTER — APPOINTMENT (OUTPATIENT)
Dept: RADIOLOGY | Facility: MEDICAL CENTER | Age: 77
End: 2017-08-01
Attending: SURGERY
Payer: MEDICARE

## 2017-08-01 ENCOUNTER — HOSPITAL ENCOUNTER (OUTPATIENT)
Facility: MEDICAL CENTER | Age: 77
End: 2017-08-01
Attending: SURGERY | Admitting: SURGERY
Payer: MEDICARE

## 2017-08-01 VITALS
BODY MASS INDEX: 15.66 KG/M2 | RESPIRATION RATE: 16 BRPM | HEART RATE: 84 BPM | OXYGEN SATURATION: 93 % | HEIGHT: 63 IN | TEMPERATURE: 97.4 F | WEIGHT: 88.4 LBS

## 2017-08-01 DIAGNOSIS — C50.911 MALIGNANT NEOPLASM OF RIGHT FEMALE BREAST, UNSPECIFIED SITE OF BREAST: ICD-10-CM

## 2017-08-01 PROBLEM — C50.211 MALIGNANT NEOPLASM OF UPPER-INNER QUADRANT OF RIGHT FEMALE BREAST (HCC): Status: ACTIVE | Noted: 2017-08-01

## 2017-08-01 PROCEDURE — 502240 HCHG MISC OR SUPPLY RC 0272: Performed by: SURGERY

## 2017-08-01 PROCEDURE — A6404 STERILE GAUZE > 48 SQ IN: HCPCS | Performed by: SURGERY

## 2017-08-01 PROCEDURE — 500368 HCHG DRAIN, 7MM FLAT-FLUTED: Performed by: SURGERY

## 2017-08-01 PROCEDURE — 88331 PATH CONSLTJ SURG 1 BLK 1SPC: CPT | Mod: 91

## 2017-08-01 PROCEDURE — 38792 RA TRACER ID OF SENTINL NODE: CPT | Mod: RT

## 2017-08-01 PROCEDURE — 700101 HCHG RX REV CODE 250

## 2017-08-01 PROCEDURE — 88307 TISSUE EXAM BY PATHOLOGIST: CPT | Mod: 59

## 2017-08-01 PROCEDURE — 160048 HCHG OR STATISTICAL LEVEL 1-5: Performed by: SURGERY

## 2017-08-01 PROCEDURE — 160002 HCHG RECOVERY MINUTES (STAT): Performed by: SURGERY

## 2017-08-01 PROCEDURE — 88311 DECALCIFY TISSUE: CPT

## 2017-08-01 PROCEDURE — 160035 HCHG PACU - 1ST 60 MINS PHASE I: Performed by: SURGERY

## 2017-08-01 PROCEDURE — 501838 HCHG SUTURE GENERAL: Performed by: SURGERY

## 2017-08-01 PROCEDURE — 500423 HCHG DRESSING, ABD COMBINE: Performed by: SURGERY

## 2017-08-01 PROCEDURE — 160009 HCHG ANES TIME/MIN: Performed by: SURGERY

## 2017-08-01 PROCEDURE — 160041 HCHG SURGERY MINUTES - EA ADDL 1 MIN LEVEL 4: Performed by: SURGERY

## 2017-08-01 PROCEDURE — 500122 HCHG BOVIE, BLADE: Performed by: SURGERY

## 2017-08-01 PROCEDURE — 700111 HCHG RX REV CODE 636 W/ 250 OVERRIDE (IP)

## 2017-08-01 PROCEDURE — 160036 HCHG PACU - EA ADDL 30 MINS PHASE I: Performed by: SURGERY

## 2017-08-01 PROCEDURE — 160022 HCHG BLOCK: Performed by: SURGERY

## 2017-08-01 PROCEDURE — A9270 NON-COVERED ITEM OR SERVICE: HCPCS

## 2017-08-01 PROCEDURE — 500700 HCHG HEMOCLIP, SMALL (RED): Performed by: SURGERY

## 2017-08-01 PROCEDURE — 160029 HCHG SURGERY MINUTES - 1ST 30 MINS LEVEL 4: Performed by: SURGERY

## 2017-08-01 PROCEDURE — 502594 HCHG SCISSOR HANDLE, HARMONIC ACE: Performed by: SURGERY

## 2017-08-01 PROCEDURE — A6402 STERILE GAUZE <= 16 SQ IN: HCPCS | Performed by: SURGERY

## 2017-08-01 PROCEDURE — A6403 STERILE GAUZE>16 <= 48 SQ IN: HCPCS | Performed by: SURGERY

## 2017-08-01 PROCEDURE — 500389 HCHG DRAIN, RESERVOIR SUCT JP 100CC: Performed by: SURGERY

## 2017-08-01 PROCEDURE — 700102 HCHG RX REV CODE 250 W/ 637 OVERRIDE(OP)

## 2017-08-01 PROCEDURE — 501445 HCHG STAPLER, SKIN DISP: Performed by: SURGERY

## 2017-08-01 RX ORDER — ACETAMINOPHEN 500 MG
TABLET ORAL
Status: COMPLETED
Start: 2017-08-01 | End: 2017-08-01

## 2017-08-01 RX ORDER — BUPIVACAINE HYDROCHLORIDE AND EPINEPHRINE 5; 5 MG/ML; UG/ML
INJECTION, SOLUTION EPIDURAL; INTRACAUDAL; PERINEURAL
Status: DISCONTINUED
Start: 2017-08-01 | End: 2017-08-01 | Stop reason: HOSPADM

## 2017-08-01 RX ORDER — ALPRAZOLAM 0.25 MG/1
TABLET ORAL
Status: COMPLETED
Start: 2017-08-01 | End: 2017-08-01

## 2017-08-01 RX ORDER — CELECOXIB 200 MG/1
CAPSULE ORAL
Status: COMPLETED
Start: 2017-08-01 | End: 2017-08-01

## 2017-08-01 RX ORDER — SODIUM CHLORIDE, SODIUM LACTATE, POTASSIUM CHLORIDE, CALCIUM CHLORIDE 600; 310; 30; 20 MG/100ML; MG/100ML; MG/100ML; MG/100ML
1000 INJECTION, SOLUTION INTRAVENOUS
Status: COMPLETED | OUTPATIENT
Start: 2017-08-01 | End: 2017-08-01

## 2017-08-01 RX ORDER — BUPIVACAINE HYDROCHLORIDE AND EPINEPHRINE 5; 5 MG/ML; UG/ML
INJECTION, SOLUTION EPIDURAL; INTRACAUDAL; PERINEURAL
Status: DISCONTINUED | OUTPATIENT
Start: 2017-08-01 | End: 2017-08-01 | Stop reason: HOSPADM

## 2017-08-01 RX ORDER — ISOSULFAN BLUE 50 MG/5ML
INJECTION, SOLUTION SUBCUTANEOUS
Status: DISCONTINUED | OUTPATIENT
Start: 2017-08-01 | End: 2017-08-01 | Stop reason: HOSPADM

## 2017-08-01 RX ORDER — ISOSULFAN BLUE 50 MG/5ML
INJECTION, SOLUTION SUBCUTANEOUS
Status: DISCONTINUED
Start: 2017-08-01 | End: 2017-08-01 | Stop reason: HOSPADM

## 2017-08-01 RX ORDER — GABAPENTIN 300 MG/1
CAPSULE ORAL
Status: COMPLETED
Start: 2017-08-01 | End: 2017-08-01

## 2017-08-01 RX ORDER — OXYCODONE HCL 5 MG/5 ML
SOLUTION, ORAL ORAL
Status: COMPLETED
Start: 2017-08-01 | End: 2017-08-01

## 2017-08-01 RX ADMIN — FENTANYL CITRATE 25 MCG: 50 INJECTION, SOLUTION INTRAMUSCULAR; INTRAVENOUS at 18:43

## 2017-08-01 RX ADMIN — ALPRAZOLAM 0.25 MG: 0.25 TABLET ORAL at 11:45

## 2017-08-01 RX ADMIN — SODIUM CHLORIDE, SODIUM LACTATE, POTASSIUM CHLORIDE, CALCIUM CHLORIDE 1000 ML: 600; 310; 30; 20 INJECTION, SOLUTION INTRAVENOUS at 12:00

## 2017-08-01 RX ADMIN — CELECOXIB 200 MG: 200 CAPSULE ORAL at 13:05

## 2017-08-01 RX ADMIN — ACETAMINOPHEN 1000 MG: 500 TABLET, FILM COATED ORAL at 13:05

## 2017-08-01 RX ADMIN — GABAPENTIN 300 MG: 300 CAPSULE ORAL at 13:05

## 2017-08-01 RX ADMIN — OXYCODONE HYDROCHLORIDE 5 MG: 5 SOLUTION ORAL at 18:20

## 2017-08-01 ASSESSMENT — PAIN SCALES - GENERAL
PAINLEVEL_OUTOF10: 2
PAINLEVEL_OUTOF10: 2
PAINLEVEL_OUTOF10: 3
PAINLEVEL_OUTOF10: 3

## 2017-08-01 NOTE — OR NURSING
Multimodal medications given to pt per Dr. Elkins's order. Comfort level checked, no needs at this time, family at the bedside.

## 2017-08-01 NOTE — IP AVS SNAPSHOT
8/1/2017    Leanna Campbell  [address] [place] [id number]    Dear Leanna:    [facility] wants to ensure your discharge home is safe and you or your loved ones have had all of your questions answered regarding your care after you leave the hospital.    Below is a list of resources and contact information should you have any questions regarding your hospital stay, follow-up instructions, or active medical symptoms.    Questions or Concerns Regarding… Contact   Medical Questions Related to Your Discharge  (7 days a week, 8am-5pm) Contact a Nurse Care Coordinator   [phone]   Medical Questions Not Related to Your Discharge  (24 hours a day / 7 days a week)  Contact the Nurse Health Line   [phone]    Medications or Discharge Instructions Refer to your discharge packet   or contact your [facility] Primary Care Provider   [phone]   Follow-up Appointment(s) Schedule your appointment via [employer]   or contact Scheduling [phone]   Billing Review your statement via [employer]  or contact Billing [phone]   Medical Records Review your records via [employer]   or contact Medical Records [phone]     You may receive a telephone call within two days of discharge. This call is to make certain you understand your discharge instructions and have the opportunity to have any questions answered. You can also easily access your medical information, test results and upcoming appointments via the [employer] free online health management tool. You can learn more and sign up at [employer]. For assistance setting up your Work[employer] account, please call [phone].    Once again, we want to ensure your discharge home is safe and that you have a clear understanding of any next steps in your care. If you have any questions or concerns, please do not hesitate to contact us, we are here for you. Thank you for choosing [facility] for your healthcare needs.    Sincerely,    Your [facility] Healthcare Team

## 2017-08-01 NOTE — IP AVS SNAPSHOT
" Home Care Instructions                                                                                                                Name:Leanna Campbell  Medical Record Number:[id number]  CSN: [id number]    YOB: 1940   Age: 77 y.o.  Sex: female  HT:1.6 m (5' 3\") WT: 40.1 kg (88 lb 6.5 oz)          Admit Date: 8/1/2017     Discharge Date:   Today's Date: 8/1/2017  Attending Doctor:  Priscilla Quintana M.D.                  Allergies:  Codeine; Darvon; Pcn; and Ciprofloxacin                Discharge Instructions         ACTIVITY: Rest and take it easy for the first 24 hours.  A responsible adult is recommended to remain with you during that time.  It is normal to feel sleepy.  We encourage you to not do anything that requires balance, judgment or coordination.    MILD FLU-LIKE SYMPTOMS ARE NORMAL. YOU MAY EXPERIENCE GENERALIZED MUSCLE ACHES, THROAT IRRITATION, HEADACHE AND/OR SOME NAUSEA.    FOR 24 HOURS DO NOT:  Drive, operate machinery or run household appliances.  Drink beer or alcoholic beverages.   Make important decisions or sign legal documents.    SPECIAL INSTRUCTIONS: **FOLLOW DR. QUINTANA'S INSTRUCTIONS*    DIET: To avoid nausea, slowly advance diet as tolerated, avoiding spicy or greasy foods for the first day.  Add more substantial food to your diet according to your physician's instructions.  Babies can be fed formula or breast milk as soon as they are hungry.  INCREASE FLUIDS AND FIBER TO AVOID CONSTIPATION.    SURGICAL DRESSING/BATHING: *NO SHOWERS WHEN DRAINS ARE PRESENT**    FOLLOW-UP APPOINTMENT:  A follow-up appointment should be arranged with your doctor in **FOLLOW UP WITH DR. QUINTANA*; call to schedule.    You should CALL YOUR PHYSICIAN if you develop:  Fever greater than 101 degrees F.  Pain not relieved by medication, or persistent nausea or vomiting.  Excessive bleeding (blood soaking through dressing) or unexpected drainage from the wound.  Extreme redness or swelling around the incision " site, drainage of pus or foul smelling drainage.  Inability to urinate or empty your bladder within 8 hours.  Problems with breathing or chest pain.    You should call 911 if you develop problems with breathing or chest pain.  If you are unable to contact your doctor or surgical center, you should go to the nearest emergency room or urgent care center.  Physician's telephone #: *DR. GONZALEZ [phone]**    If any questions arise, call your doctor.  If your doctor is not available, please feel free to call the Surgical Center at [phone].  The Center is open Monday through Friday from 7AM to 7PM.  You can also call the HEALTH HOTLINE open 24 hours/day, 7 days/week and speak to a nurse at [phone], or toll free at [phone].    A registered nurse may call you a few days after your surgery to see how you are doing after your procedure.    MEDICATIONS: Resume taking daily medication.  Take prescribed pain medication with food.  If no medication is prescribed, you may take non-aspirin pain medication if needed.  PAIN MEDICATION CAN BE VERY CONSTIPATING.  Take a stool softener or laxative such as senokot, pericolace, or milk of magnesia if needed.    Prescription given for *NORCO AND ZOFRAN**.  Last pain medication given at *_____6:20 PM______________________**.    If your physician has prescribed pain medication that includes Acetaminophen (Tylenol), do not take additional Acetaminophen (Tylenol) while taking the prescribed medication.    Depression / Suicide Risk    As you are discharged from this [facility] facility, it is important to learn how to keep safe from harming yourself.    Recognize the warning signs:  · Abrupt changes in personality, positive or negative- including increase in energy   · Giving away possessions  · Change in eating patterns- significant weight changes-  positive or negative  · Change in sleeping patterns- unable to sleep or sleeping all the time   · Unwillingness or inability to  communicate  · Depression  · Unusual sadness, discouragement and loneliness  · Talk of wanting to die  · Neglect of personal appearance   · Rebelliousness- reckless behavior  · Withdrawal from people/activities they love  · Confusion- inability to concentrate     If you or a loved one observes any of these behaviors or has concerns about self-harm, here's what you can do:  · Talk about it- your feelings and reasons for harming yourself  · Remove any means that you might use to hurt yourself (examples: pills, rope, extension cords, firearm)  · Get professional help from the community (Mental Health, Substance Abuse, psychological counseling)  · Do not be alone:Call your Safe Contact- someone whom you trust who will be there for you.  · Call your local CRISIS HOTLINE 990-7517 or 925-437-2588  · Call your local Children's Mobile Crisis Response Team [place] (547) 371-2616 or www.[employer]  · Call the toll free National Suicide Prevention Hotlines   · National Suicide Prevention Lifeline 519-502-OZLU (0560)  [place] [facility] Line Network 800-SUICIDE (908-2664)Discharge Education for patients on LEXI (Obstructive Sleep Apnea) Protocol    Prior to receiving sedation or anesthesia, we screen all patients for Obstructive Sleep Apnea.  During your screening, you were identified as having suspected, but not confirmed Obstructive Sleep Apnea(LEXI).    What is Obstructive Sleep Apnea?  Sleep apnea (AP-ne-ah) is a common disorder which involves breathing pauses that occur during sleep.  These can last from 10 seconds to a minute or longer.  Normal breathing resumes often with a loud snort or choking sound.    Sleep apnea occurs in all age groups and both genders but is more common in men and people over 40 years of age.  It has been estimated that as many as 18 million Americans have sleep apnea.  Most people who have sleep apnea don’t know they have it because it only occurs during sleep.  A family member and/or bed  partner may first notice the signs of sleep apnea.  Sleep apnea is a chronic (ongoing) condition that disrupts the quality and quantity of your sleep repeatedly throughout the night.  This often results in excessive daytime sleepiness or fatigue during the day.  It may also contribute to high blood pressure, heart problems, and complications following medications used for surgery and procedures.    To establish a definitive diagnosis, further testing from a specialist would be needed.  We recommend that you follow up with your primary care physician.    We recommend that you should be with an adult observer for at least 24 hours after your sedation/anesthesia.  If you have a CPAP machine, you should wear it during any sleep period (day or night) for the week following your procedure.  We encourage you to sleep on your side or in a sitting position, even with napping.  Lying flat on your back increases the risk of apnea and airway obstruction during your post procedure recovery period.    It is important to prevent over-sedation that could increase your risk for apnea.  Please take all pain medication as directed by your physician.  If you are not getting pain relief, please contact your physician to discuss possible approaches to relieving pain while minimizing medications that can affect your breathing and oxygen levels.      ·     Additional Information     Discharge Education for patients on LEXI (Obstructive Sleep Apnea) Protocol    Prior to receiving sedation or anesthesia, we screen all patients for Obstructive Sleep Apnea.  During your screening, you were identified as having suspected, but not confirmed Obstructive Sleep Apnea(LEXI).    What is Obstructive Sleep Apnea?  Sleep apnea (AP-ne-ah) is a common disorder which involves breathing pauses that occur during sleep.  These can last from 10 seconds to a minute or longer.  Normal breathing resumes often with a loud snort or choking sound.    Sleep apnea  occurs in all age groups and both genders but is more common in men and people over 40 years of age.  It has been estimated that as many as 18 million Americans have sleep apnea.  Most people who have sleep apnea don’t know they have it because it only occurs during sleep.  A family member and/or bed partner may first notice the signs of sleep apnea.  Sleep apnea is a chronic (ongoing) condition that disrupts the quality and quantity of your sleep repeatedly throughout the night.  This often results in excessive daytime sleepiness or fatigue during the day.  It may also contribute to high blood pressure, heart problems, and complications following medications used for surgery and procedures.    To establish a definitive diagnosis, further testing from a specialist would be needed.  We recommend that you follow up with your primary care physician.    We recommend that you should be with an adult observer for at least 24 hours after your sedation/anesthesia.  If you have a CPAP machine, you should wear it during any sleep period (day or night) for the week following your procedure.  We encourage you to sleep on your side or in a sitting position, even with napping.  Lying flat on your back increases the risk of apnea and airway obstruction during your post procedure recovery period.    It is important to prevent over-sedation that could increase your risk for apnea.  Please take all pain medication as directed by your physician.  If you are not getting pain relief, please contact your physician to discuss possible approaches to relieving pain while minimizing medications that can affect your breathing and oxygen levels.                 Medication List      CONTINUE taking these medications        Instructions    Morning Afternoon Evening Bedtime    CARAFATE 1 GM Tabs   Generic drug:  sucralfate        Take 1 g by mouth 4 Times a Day,Before Meals and at Bedtime.   Dose:  1 g                        * COMBIVENT RESPIMAT   MCG/ACT Aers   Generic drug:  ipratropium-albuterol        Inhale 1 Puff by mouth 4 times a day.   Dose:  1 Puff                        * ipratropium-albuterol 0.5-2.5 (3) MG/3ML nebulizer solution   Commonly known as:  DUONEB        3 mL by Nebulization route 4 times a day.   Dose:  3 mL                        KLS LAXACLEAR PO        Take  by mouth every day.                        MUCINEX 600 MG Tb12   Generic drug:  guaifenesin LA        Take 600 mg by mouth every 12 hours.   Dose:  600 mg                        SPIRIVA HANDIHALER 18 MCG Caps   Generic drug:  tiotropium        Inhale 18 mcg by mouth every day.   Dose:  18 mcg                        SYMBICORT 160-4.5 MCG/ACT Aero   Generic drug:  budesonide-formoterol        INHALE 2 PUFFS BY MOUTH TWICE DAILY. RINSE MOUTH AFTER USE.                        * Notice:  This list has 2 medication(s) that are the same as other medications prescribed for you. Read the directions carefully, and ask your doctor or other care provider to review them with you.            Medication Information     Above and/or attached are the medications your physician expects you to take upon discharge. Review all of your home medications and newly ordered medications with your doctor and/or pharmacist. Follow medication instructions as directed by your doctor and/or pharmacist. Please keep your medication list with you and share with your physician. Update the information when medications are discontinued, doses are changed, or new medications (including over-the-counter products) are added; and carry medication information at all times in the event of emergency situations.        Resources     Quit Smoking / Tobacco Use:    I understand the use of any tobacco products increases my chance of suffering from future heart disease or stroke and could cause other illnesses which may shorten my life. Quitting the use of tobacco products is the single most important thing I can do to  improve my health. For further information on smoking / tobacco cessation call a Toll Free Quit Line at 1-227.852.1965 (*[employer] [place]) or 1-947.578.9613 (American Lung Association) or you can access the web based program at www.lungusa.org.    [place] Tobacco Users Help Line:  (628) 291-4416       Toll Free: 1-144.709.8828  Quit Tobacco Program [facility] (577)568-8831    Crisis Hotline:    [place] Crisis Hotline:  8-463-TGYGWCI or 1-395.163.7860    [place] Crisis Hotline:    1-610.643.5325 or 005-688-2082    Discharge Survey:   Thank you for choosing [facility]. We hope we did everything we could to make your hospital stay a pleasant one. You may be receiving a survey and we would appreciate your time and participation in answering the questions. Your input is very valuable to us in our efforts to improve our service to our patients and their families.            Signatures     My signature on this form indicates that:    1. I acknowledge receipt and understanding of these Home Care Instruction.  2. My questions regarding this information have been answered to my satisfaction.  3. I have formulated a plan with my discharge nurse to obtain my prescribed medications for home.    __________________________________      __________________________________                   Patient Signature                                 Guardian/Responsible Adult Signature      __________________________________                 __________       ________                       Nurse Signature                                               Date                 Time

## 2017-08-02 NOTE — OP REPORT
Pre op diagnosis:  Malignant neoplasm upper inner breast  Post op diagnosis:  Same    Procedure:    1.  Bilateral total mastectomies with removal of subglandular silicone implants and bilateral capsulectomies.  2.  Right sentinel lymph node mapping with excision of deep axillary lymph nodes    Surgeon:  Priscilla Quintana MD  Assistant:  Key Jones MD  Anesthesiologist:  Traci Elkins MD    Anesthesia:  General with PECS block  Pre op meds:  Ancef  ASA 2    Indications:  This is a 77 year old woman with a history of previous right breast cancer, treated with breast conservation, who now presents with a new upper inner cancer.  She has severe capsular contracture and desires bilateral total mastectomies and declines reconstruction.  We also discussed risks and benefits of node evaluation and she feels ready to proceed.    Findings:  1.  Two benign sentinel nodes, radioactive counts of 100.  Background counts <10%, no suspicious palpable nodes.    2.  Extruded silicone within the right capsule.    3.  Skin overlying the large palpable right medial mass was excised.  Second firm mass found in the upper inner breast, three clips placed within the skin flap at this site.      Procedure summary:  The patient underwent radiotracer injection of the right breast and then was taken to the operating room and anesthetized in a supine position.  Gamma probe was not able to definitely identify the hot spot, therefore isosulfan blue dye was injected under sterile conditions in the right subareolar space.  She was then prepped with betadine and draped in sterile fashion.  Time out was confirmed.  I started with the left, non-cancer side, and an ellpse including her nipple areolar complex was excised and her extremely thin skin flaps (she weighs 88lb) were raised to the sternum, the latissimus dorsi, inframammary fold, and supraclavicular region.  The entire capsule was removed intact.  I irrigated the wound, excised some redundant  skin, and then after ensuring hemostasis, a 7fr flat fluted drain was inserted and secured with a nylon.  3-0 vicryls were used to close the deep dermal layer and 4-0 monocryl was used to close the skin.    I then turned my attention to the right breast.  In similar and symmetrical fashion, extending the incision slightly medially to completely excise the medial mass, the same procedure was performed.  There was some extrusion of silicone from intracapsular rupture on this side.  We irrigated and ensure hemostasis.    I then used the gamma probe to identify the two sentinel nodes which were deep to the axillary fascia and these were excised using the Harmonic focus scalpel.  We ensure hemostasis and then a drain was inserted and the wound was close in symmetrical fashion.  I was informed all counts are correct.      CC: Austyn Amanda MD

## 2017-08-02 NOTE — OR NURSING
1728  RECEIVED PATIENT FROM OR.  REPORT FROM DR. MEZA.  NO AIRWAY IN PLACE.  RESPIRATIONS ARE EVEN AND UNLABORED.  GAUZE AND TAPE TO ANTERIOR CHEST ARE CDI.  RIGHT AND LEFT JUAN PABLO DRAINS IN PLACE.    1810  DAUGHTER KATHRINE TO BEDSIDE.    1819  PATIENT AWAKE.  REPORTS CHEST PAIN 1.  TAKING PO WITHOUT DIFFICULTY.      1820  MEDICATED WITH PO OXYCODONE.    1824  DAUGHTER ISMAEL TO BEDSIDE.    1832  WARM BLANKETS AND LEMUEL PAWS FOR COMFORT.    1843  MEDICATED WITH IV FENTANYL PER PATIENT REQUEST.     1859  PATIENT REPORTS SLIGHT NAUSEA.  CRACKERS AND GINGER ALE GIVEN.    1930  DISCHARGED.  DISCHARGE INSTRUCTIONS GIVEN TO PATIENT AND HER DAUGHTERS.  A VERBAL UNDERSTANDING OF ALL INSTRUCTIONS WAS STATED.  PATIENT TAKING PO AND AMBULATING WITHOUT DIFFICULTY.  DRESSING TO CHEST REMAINS CDI.  PATIENT STATES SHE IS READY TO GO HOME.

## 2017-08-02 NOTE — DISCHARGE INSTRUCTIONS
ACTIVITY: Rest and take it easy for the first 24 hours.  A responsible adult is recommended to remain with you during that time.  It is normal to feel sleepy.  We encourage you to not do anything that requires balance, judgment or coordination.    MILD FLU-LIKE SYMPTOMS ARE NORMAL. YOU MAY EXPERIENCE GENERALIZED MUSCLE ACHES, THROAT IRRITATION, HEADACHE AND/OR SOME NAUSEA.    FOR 24 HOURS DO NOT:  Drive, operate machinery or run household appliances.  Drink beer or alcoholic beverages.   Make important decisions or sign legal documents.    SPECIAL INSTRUCTIONS: **FOLLOW DR. GONZALEZ'S INSTRUCTIONS*    DIET: To avoid nausea, slowly advance diet as tolerated, avoiding spicy or greasy foods for the first day.  Add more substantial food to your diet according to your physician's instructions.  Babies can be fed formula or breast milk as soon as they are hungry.  INCREASE FLUIDS AND FIBER TO AVOID CONSTIPATION.    SURGICAL DRESSING/BATHING: *NO SHOWERS WHEN DRAINS ARE PRESENT**    FOLLOW-UP APPOINTMENT:  A follow-up appointment should be arranged with your doctor in **FOLLOW UP WITH DR. GONZALEZ*; call to schedule.    You should CALL YOUR PHYSICIAN if you develop:  Fever greater than 101 degrees F.  Pain not relieved by medication, or persistent nausea or vomiting.  Excessive bleeding (blood soaking through dressing) or unexpected drainage from the wound.  Extreme redness or swelling around the incision site, drainage of pus or foul smelling drainage.  Inability to urinate or empty your bladder within 8 hours.  Problems with breathing or chest pain.    You should call 911 if you develop problems with breathing or chest pain.  If you are unable to contact your doctor or surgical center, you should go to the nearest emergency room or urgent care center.  Physician's telephone #: *DR. GONZALEZ [phone]**    If any questions arise, call your doctor.  If your doctor is not available, please feel free to call the Surgical Center at  [phone].  The Center is open Monday through Friday from 7AM to 7PM.  You can also call the HEALTH HOTLINE open 24 hours/day, 7 days/week and speak to a nurse at [phone], or toll free at [phone].    A registered nurse may call you a few days after your surgery to see how you are doing after your procedure.    MEDICATIONS: Resume taking daily medication.  Take prescribed pain medication with food.  If no medication is prescribed, you may take non-aspirin pain medication if needed.  PAIN MEDICATION CAN BE VERY CONSTIPATING.  Take a stool softener or laxative such as senokot, pericolace, or milk of magnesia if needed.    Prescription given for *NORCO AND ZOFRAN**.  Last pain medication given at *_____6:20 PM______________________**.    If your physician has prescribed pain medication that includes Acetaminophen (Tylenol), do not take additional Acetaminophen (Tylenol) while taking the prescribed medication.    Depression / Suicide Risk    As you are discharged from this [facility] facility, it is important to learn how to keep safe from harming yourself.    Recognize the warning signs:  · Abrupt changes in personality, positive or negative- including increase in energy   · Giving away possessions  · Change in eating patterns- significant weight changes-  positive or negative  · Change in sleeping patterns- unable to sleep or sleeping all the time   · Unwillingness or inability to communicate  · Depression  · Unusual sadness, discouragement and loneliness  · Talk of wanting to die  · Neglect of personal appearance   · Rebelliousness- reckless behavior  · Withdrawal from people/activities they love  · Confusion- inability to concentrate     If you or a loved one observes any of these behaviors or has concerns about self-harm, here's what you can do:  · Talk about it- your feelings and reasons for harming yourself  · Remove any means that you might use to hurt yourself (examples: pills, rope,  extension cords, firearm)  · Get professional help from the community (Mental Health, Substance Abuse, psychological counseling)  · Do not be alone:Call your Safe Contact- someone whom you trust who will be there for you.  · Call your local CRISIS HOTLINE 534-4243 or 596-814-7264  · Call your local Children's Mobile Crisis Response Team [place] (702) 212-6475 or www."[employer]"  · Call the toll free National Suicide Prevention Hotlines   · National Suicide Prevention Lifeline 708-873-SLBT (1025)  [facility] Line Network 800-SUICIDE (662-4902)Discharge Education for patients on LEXI (Obstructive Sleep Apnea) Protocol    Prior to receiving sedation or anesthesia, we screen all patients for Obstructive Sleep Apnea.  During your screening, you were identified as having suspected, but not confirmed Obstructive Sleep Apnea(LEXI).    What is Obstructive Sleep Apnea?  Sleep apnea (AP-ne-ah) is a common disorder which involves breathing pauses that occur during sleep.  These can last from 10 seconds to a minute or longer.  Normal breathing resumes often with a loud snort or choking sound.    Sleep apnea occurs in all age groups and both genders but is more common in men and people over 40 years of age.  It has been estimated that as many as 18 million Americans have sleep apnea.  Most people who have sleep apnea don’t know they have it because it only occurs during sleep.  A family member and/or bed partner may first notice the signs of sleep apnea.  Sleep apnea is a chronic (ongoing) condition that disrupts the quality and quantity of your sleep repeatedly throughout the night.  This often results in excessive daytime sleepiness or fatigue during the day.  It may also contribute to high blood pressure, heart problems, and complications following medications used for surgery and procedures.    To establish a definitive diagnosis, further testing from a specialist would be needed.  We recommend that you follow up  with your primary care physician.    We recommend that you should be with an adult observer for at least 24 hours after your sedation/anesthesia.  If you have a CPAP machine, you should wear it during any sleep period (day or night) for the week following your procedure.  We encourage you to sleep on your side or in a sitting position, even with napping.  Lying flat on your back increases the risk of apnea and airway obstruction during your post procedure recovery period.    It is important to prevent over-sedation that could increase your risk for apnea.  Please take all pain medication as directed by your physician.  If you are not getting pain relief, please contact your physician to discuss possible approaches to relieving pain while minimizing medications that can affect your breathing and oxygen levels.      ·

## 2017-09-07 ENCOUNTER — HOSPITAL ENCOUNTER (OUTPATIENT)
Dept: RADIOLOGY | Facility: MEDICAL CENTER | Age: 77
End: 2017-09-07

## 2017-12-20 NOTE — PROGRESS NOTES
Letter sent to pt was returned to sender due to incorrect mail address. Phoned to schedule visit to deliver and review breast cancer treatment summary/ survivorship care plan.  Pt declined to meet and stated she would prefer that the SCP be mailed with telephone follow up. Asked about the returned mail, pt stated her address has changed. Verified the patient's new address and updated it in fruux.

## 2018-01-01 ENCOUNTER — APPOINTMENT (OUTPATIENT)
Dept: CARDIOLOGY | Facility: MEDICAL CENTER | Age: 78
DRG: 329 | End: 2018-01-01
Attending: SURGERY
Payer: MEDICARE

## 2018-01-01 ENCOUNTER — APPOINTMENT (OUTPATIENT)
Dept: RADIOLOGY | Facility: MEDICAL CENTER | Age: 78
DRG: 329 | End: 2018-01-01
Attending: SURGERY
Payer: MEDICARE

## 2018-01-01 ENCOUNTER — APPOINTMENT (OUTPATIENT)
Dept: RADIOLOGY | Facility: MEDICAL CENTER | Age: 78
DRG: 329 | End: 2018-01-01
Attending: EMERGENCY MEDICINE
Payer: MEDICARE

## 2018-01-01 ENCOUNTER — HOSPITAL ENCOUNTER (INPATIENT)
Facility: MEDICAL CENTER | Age: 78
LOS: 4 days | DRG: 329 | End: 2018-11-09
Attending: EMERGENCY MEDICINE | Admitting: SURGERY
Payer: MEDICARE

## 2018-01-01 ENCOUNTER — PATIENT OUTREACH (OUTPATIENT)
Dept: OTHER | Facility: MEDICAL CENTER | Age: 78
End: 2018-01-01

## 2018-01-01 VITALS
WEIGHT: 112.66 LBS | TEMPERATURE: 97.9 F | OXYGEN SATURATION: 78 % | HEIGHT: 62 IN | RESPIRATION RATE: 7 BRPM | SYSTOLIC BLOOD PRESSURE: 125 MMHG | HEART RATE: 75 BPM | BODY MASS INDEX: 20.73 KG/M2 | DIASTOLIC BLOOD PRESSURE: 62 MMHG

## 2018-01-01 DIAGNOSIS — K56.609 SBO (SMALL BOWEL OBSTRUCTION) (HCC): ICD-10-CM

## 2018-01-01 DIAGNOSIS — R11.2 NON-INTRACTABLE VOMITING WITH NAUSEA, UNSPECIFIED VOMITING TYPE: ICD-10-CM

## 2018-01-01 LAB
ABO GROUP BLD: NORMAL
ABO GROUP BLD: NORMAL
ACTION RANGE TRIGGERED IACRT: NO
ACTION RANGE TRIGGERED IACRT: NO
ALBUMIN SERPL BCP-MCNC: 1.9 G/DL (ref 3.2–4.9)
ALBUMIN SERPL BCP-MCNC: 2 G/DL (ref 3.2–4.9)
ALBUMIN SERPL BCP-MCNC: 2.4 G/DL (ref 3.2–4.9)
ALBUMIN SERPL BCP-MCNC: 2.4 G/DL (ref 3.2–4.9)
ALBUMIN SERPL BCP-MCNC: 3.1 G/DL (ref 3.2–4.9)
ALBUMIN SERPL BCP-MCNC: 5.1 G/DL (ref 3.2–4.9)
ALBUMIN/GLOB SERPL: 1.1 G/DL
ALBUMIN/GLOB SERPL: 1.2 G/DL
ALBUMIN/GLOB SERPL: 1.3 G/DL
ALBUMIN/GLOB SERPL: 1.6 G/DL
ALBUMIN/GLOB SERPL: 1.7 G/DL
ALBUMIN/GLOB SERPL: 1.8 G/DL
ALP SERPL-CCNC: 23 U/L (ref 30–99)
ALP SERPL-CCNC: 25 U/L (ref 30–99)
ALP SERPL-CCNC: 32 U/L (ref 30–99)
ALP SERPL-CCNC: 34 U/L (ref 30–99)
ALP SERPL-CCNC: 38 U/L (ref 30–99)
ALP SERPL-CCNC: 55 U/L (ref 30–99)
ALT SERPL-CCNC: 10 U/L (ref 2–50)
ALT SERPL-CCNC: 11 U/L (ref 2–50)
ALT SERPL-CCNC: 7 U/L (ref 2–50)
ALT SERPL-CCNC: 8 U/L (ref 2–50)
ALT SERPL-CCNC: 9 U/L (ref 2–50)
ALT SERPL-CCNC: 9 U/L (ref 2–50)
ANION GAP SERPL CALC-SCNC: 10 MMOL/L (ref 0–11.9)
ANION GAP SERPL CALC-SCNC: 16 MMOL/L (ref 0–11.9)
ANION GAP SERPL CALC-SCNC: 2 MMOL/L (ref 0–11.9)
ANION GAP SERPL CALC-SCNC: 4 MMOL/L (ref 0–11.9)
ANION GAP SERPL CALC-SCNC: 4 MMOL/L (ref 0–11.9)
ANION GAP SERPL CALC-SCNC: 6 MMOL/L (ref 0–11.9)
ANION GAP SERPL CALC-SCNC: 6 MMOL/L (ref 0–11.9)
ANION GAP SERPL CALC-SCNC: 8 MMOL/L (ref 0–11.9)
ANION GAP SERPL CALC-SCNC: 9 MMOL/L (ref 0–11.9)
ANISOCYTOSIS BLD QL SMEAR: ABNORMAL
AST SERPL-CCNC: 13 U/L (ref 12–45)
AST SERPL-CCNC: 20 U/L (ref 12–45)
AST SERPL-CCNC: 21 U/L (ref 12–45)
AST SERPL-CCNC: 33 U/L (ref 12–45)
AST SERPL-CCNC: 34 U/L (ref 12–45)
AST SERPL-CCNC: 37 U/L (ref 12–45)
BARCODED ABORH UBTYP: 5100
BARCODED PRD CODE UBPRD: NORMAL
BARCODED UNIT NUM UBUNT: NORMAL
BASE EXCESS BLDA CALC-SCNC: -3 MMOL/L (ref -4–3)
BASE EXCESS BLDA CALC-SCNC: 0 MMOL/L (ref -4–3)
BASO STIPL BLD QL SMEAR: NORMAL
BASOPHILS # BLD AUTO: 0 % (ref 0–1.8)
BASOPHILS # BLD AUTO: 0.2 % (ref 0–1.8)
BASOPHILS # BLD AUTO: 0.3 % (ref 0–1.8)
BASOPHILS # BLD AUTO: 0.8 % (ref 0–1.8)
BASOPHILS # BLD: 0 K/UL (ref 0–0.12)
BASOPHILS # BLD: 0.02 K/UL (ref 0–0.12)
BASOPHILS # BLD: 0.03 K/UL (ref 0–0.12)
BASOPHILS # BLD: 0.09 K/UL (ref 0–0.12)
BILIRUB SERPL-MCNC: 0.2 MG/DL (ref 0.1–1.5)
BILIRUB SERPL-MCNC: 0.3 MG/DL (ref 0.1–1.5)
BILIRUB SERPL-MCNC: 0.5 MG/DL (ref 0.1–1.5)
BILIRUB SERPL-MCNC: 0.7 MG/DL (ref 0.1–1.5)
BLD GP AB SCN SERPL QL: NORMAL
BNP SERPL-MCNC: 61 PG/ML (ref 0–100)
BODY TEMPERATURE: ABNORMAL DEGREES
BODY TEMPERATURE: ABNORMAL DEGREES
BUN SERPL-MCNC: 14 MG/DL (ref 8–22)
BUN SERPL-MCNC: 16 MG/DL (ref 8–22)
BUN SERPL-MCNC: 17 MG/DL (ref 8–22)
BUN SERPL-MCNC: 20 MG/DL (ref 8–22)
BUN SERPL-MCNC: 25 MG/DL (ref 8–22)
BUN SERPL-MCNC: 26 MG/DL (ref 8–22)
BUN SERPL-MCNC: 34 MG/DL (ref 8–22)
BUN SERPL-MCNC: 36 MG/DL (ref 8–22)
BUN SERPL-MCNC: 37 MG/DL (ref 8–22)
BURR CELLS BLD QL SMEAR: NORMAL
BURR CELLS BLD QL SMEAR: NORMAL
CALCIUM SERPL-MCNC: 11.7 MG/DL (ref 8.5–10.5)
CALCIUM SERPL-MCNC: 7.6 MG/DL (ref 8.5–10.5)
CALCIUM SERPL-MCNC: 7.9 MG/DL (ref 8.5–10.5)
CALCIUM SERPL-MCNC: 7.9 MG/DL (ref 8.5–10.5)
CALCIUM SERPL-MCNC: 8 MG/DL (ref 8.5–10.5)
CALCIUM SERPL-MCNC: 8.1 MG/DL (ref 8.5–10.5)
CALCIUM SERPL-MCNC: 8.5 MG/DL (ref 8.5–10.5)
CALCIUM SERPL-MCNC: 8.9 MG/DL (ref 8.5–10.5)
CALCIUM SERPL-MCNC: 9.1 MG/DL (ref 8.5–10.5)
CHLORIDE SERPL-SCNC: 108 MMOL/L (ref 96–112)
CHLORIDE SERPL-SCNC: 111 MMOL/L (ref 96–112)
CHLORIDE SERPL-SCNC: 112 MMOL/L (ref 96–112)
CHLORIDE SERPL-SCNC: 114 MMOL/L (ref 96–112)
CHLORIDE SERPL-SCNC: 119 MMOL/L (ref 96–112)
CHLORIDE SERPL-SCNC: 122 MMOL/L (ref 96–112)
CHLORIDE SERPL-SCNC: 122 MMOL/L (ref 96–112)
CHLORIDE SERPL-SCNC: 123 MMOL/L (ref 96–112)
CHLORIDE SERPL-SCNC: 98 MMOL/L (ref 96–112)
CO2 BLDA-SCNC: 24 MMOL/L (ref 20–33)
CO2 BLDA-SCNC: 26 MMOL/L (ref 20–33)
CO2 SERPL-SCNC: 19 MMOL/L (ref 20–33)
CO2 SERPL-SCNC: 20 MMOL/L (ref 20–33)
CO2 SERPL-SCNC: 22 MMOL/L (ref 20–33)
CO2 SERPL-SCNC: 25 MMOL/L (ref 20–33)
COMPONENT R 8504R: NORMAL
CREAT SERPL-MCNC: 0.65 MG/DL (ref 0.5–1.4)
CREAT SERPL-MCNC: 0.76 MG/DL (ref 0.5–1.4)
CREAT SERPL-MCNC: 0.76 MG/DL (ref 0.5–1.4)
CREAT SERPL-MCNC: 0.81 MG/DL (ref 0.5–1.4)
CREAT SERPL-MCNC: 0.91 MG/DL (ref 0.5–1.4)
CREAT SERPL-MCNC: 0.97 MG/DL (ref 0.5–1.4)
CREAT SERPL-MCNC: 0.98 MG/DL (ref 0.5–1.4)
CREAT SERPL-MCNC: 1.06 MG/DL (ref 0.5–1.4)
CREAT SERPL-MCNC: 1.07 MG/DL (ref 0.5–1.4)
CRP SERPL HS-MCNC: 0.34 MG/DL (ref 0–0.75)
CRP SERPL HS-MCNC: 19.76 MG/DL (ref 0–0.75)
EOSINOPHIL # BLD AUTO: 0 K/UL (ref 0–0.51)
EOSINOPHIL # BLD AUTO: 0.05 K/UL (ref 0–0.51)
EOSINOPHIL # BLD AUTO: 0.07 K/UL (ref 0–0.51)
EOSINOPHIL # BLD AUTO: 0.09 K/UL (ref 0–0.51)
EOSINOPHIL NFR BLD: 0 % (ref 0–6.9)
EOSINOPHIL NFR BLD: 0.4 % (ref 0–6.9)
EOSINOPHIL NFR BLD: 0.8 % (ref 0–6.9)
EOSINOPHIL NFR BLD: 0.9 % (ref 0–6.9)
ERYTHROCYTE [DISTWIDTH] IN BLOOD BY AUTOMATED COUNT: 48.1 FL (ref 35.9–50)
ERYTHROCYTE [DISTWIDTH] IN BLOOD BY AUTOMATED COUNT: 48.2 FL (ref 35.9–50)
ERYTHROCYTE [DISTWIDTH] IN BLOOD BY AUTOMATED COUNT: 49.6 FL (ref 35.9–50)
ERYTHROCYTE [DISTWIDTH] IN BLOOD BY AUTOMATED COUNT: 51.7 FL (ref 35.9–50)
ERYTHROCYTE [DISTWIDTH] IN BLOOD BY AUTOMATED COUNT: 54.4 FL (ref 35.9–50)
ERYTHROCYTE [DISTWIDTH] IN BLOOD BY AUTOMATED COUNT: 55.2 FL (ref 35.9–50)
GIANT PLATELETS BLD QL SMEAR: NORMAL
GLOBULIN SER CALC-MCNC: 1.5 G/DL (ref 1.9–3.5)
GLOBULIN SER CALC-MCNC: 1.7 G/DL (ref 1.9–3.5)
GLOBULIN SER CALC-MCNC: 1.8 G/DL (ref 1.9–3.5)
GLOBULIN SER CALC-MCNC: 1.8 G/DL (ref 1.9–3.5)
GLOBULIN SER CALC-MCNC: 1.9 G/DL (ref 1.9–3.5)
GLOBULIN SER CALC-MCNC: 2.9 G/DL (ref 1.9–3.5)
GLUCOSE BLD-MCNC: 102 MG/DL (ref 65–99)
GLUCOSE BLD-MCNC: 104 MG/DL (ref 65–99)
GLUCOSE BLD-MCNC: 104 MG/DL (ref 65–99)
GLUCOSE BLD-MCNC: 108 MG/DL (ref 65–99)
GLUCOSE BLD-MCNC: 110 MG/DL (ref 65–99)
GLUCOSE BLD-MCNC: 117 MG/DL (ref 65–99)
GLUCOSE BLD-MCNC: 119 MG/DL (ref 65–99)
GLUCOSE BLD-MCNC: 134 MG/DL (ref 65–99)
GLUCOSE BLD-MCNC: 144 MG/DL (ref 65–99)
GLUCOSE BLD-MCNC: 53 MG/DL (ref 65–99)
GLUCOSE BLD-MCNC: 59 MG/DL (ref 65–99)
GLUCOSE BLD-MCNC: 61 MG/DL (ref 65–99)
GLUCOSE BLD-MCNC: 69 MG/DL (ref 65–99)
GLUCOSE BLD-MCNC: 70 MG/DL (ref 65–99)
GLUCOSE BLD-MCNC: 78 MG/DL (ref 65–99)
GLUCOSE BLD-MCNC: 79 MG/DL (ref 65–99)
GLUCOSE BLD-MCNC: 82 MG/DL (ref 65–99)
GLUCOSE BLD-MCNC: 82 MG/DL (ref 65–99)
GLUCOSE BLD-MCNC: 84 MG/DL (ref 65–99)
GLUCOSE BLD-MCNC: 85 MG/DL (ref 65–99)
GLUCOSE BLD-MCNC: 89 MG/DL (ref 65–99)
GLUCOSE BLD-MCNC: 91 MG/DL (ref 65–99)
GLUCOSE BLD-MCNC: 91 MG/DL (ref 65–99)
GLUCOSE BLD-MCNC: 92 MG/DL (ref 65–99)
GLUCOSE BLD-MCNC: 98 MG/DL (ref 65–99)
GLUCOSE BLD-MCNC: 99 MG/DL (ref 65–99)
GLUCOSE BLD-MCNC: 99 MG/DL (ref 65–99)
GLUCOSE SERPL-MCNC: 101 MG/DL (ref 65–99)
GLUCOSE SERPL-MCNC: 142 MG/DL (ref 65–99)
GLUCOSE SERPL-MCNC: 146 MG/DL (ref 65–99)
GLUCOSE SERPL-MCNC: 149 MG/DL (ref 65–99)
GLUCOSE SERPL-MCNC: 186 MG/DL (ref 65–99)
GLUCOSE SERPL-MCNC: 240 MG/DL (ref 65–99)
GLUCOSE SERPL-MCNC: 77 MG/DL (ref 65–99)
GLUCOSE SERPL-MCNC: 96 MG/DL (ref 65–99)
GLUCOSE SERPL-MCNC: 96 MG/DL (ref 65–99)
HCO3 BLDA-SCNC: 22.5 MMOL/L (ref 17–25)
HCO3 BLDA-SCNC: 24.6 MMOL/L (ref 17–25)
HCT VFR BLD AUTO: 22.8 % (ref 37–47)
HCT VFR BLD AUTO: 24.4 % (ref 37–47)
HCT VFR BLD AUTO: 25.6 % (ref 37–47)
HCT VFR BLD AUTO: 27.5 % (ref 37–47)
HCT VFR BLD AUTO: 36.5 % (ref 37–47)
HCT VFR BLD AUTO: 51.8 % (ref 37–47)
HGB BLD-MCNC: 11.7 G/DL (ref 12–16)
HGB BLD-MCNC: 16.6 G/DL (ref 12–16)
HGB BLD-MCNC: 6.9 G/DL (ref 12–16)
HGB BLD-MCNC: 7.7 G/DL (ref 12–16)
HGB BLD-MCNC: 8.1 G/DL (ref 12–16)
HGB BLD-MCNC: 8.8 G/DL (ref 12–16)
HOROWITZ INDEX BLDA+IHG-RTO: 303 MM[HG]
HOROWITZ INDEX BLDA+IHG-RTO: 330 MM[HG]
IMM GRANULOCYTES # BLD AUTO: 0.04 K/UL (ref 0–0.11)
IMM GRANULOCYTES NFR BLD AUTO: 0.3 % (ref 0–0.9)
IMM GRANULOCYTES NFR BLD AUTO: 0.4 % (ref 0–0.9)
IMM GRANULOCYTES NFR BLD AUTO: 0.4 % (ref 0–0.9)
INST. QUALIFIED PATIENT IIQPT: YES
INST. QUALIFIED PATIENT IIQPT: YES
LACTATE BLD-SCNC: 1 MMOL/L (ref 0.5–2)
LACTATE BLD-SCNC: 1.1 MMOL/L (ref 0.5–2)
LACTATE BLD-SCNC: 1.4 MMOL/L (ref 0.5–2)
LACTATE BLD-SCNC: 1.5 MMOL/L (ref 0.5–2)
LACTATE BLD-SCNC: 2.4 MMOL/L (ref 0.5–2)
LACTATE BLD-SCNC: 2.6 MMOL/L (ref 0.5–2)
LACTATE BLD-SCNC: 3.4 MMOL/L (ref 0.5–2)
LIPASE SERPL-CCNC: 26 U/L (ref 11–82)
LV EJECT FRACT  99904: 75
LYMPHOCYTES # BLD AUTO: 0.69 K/UL (ref 1–4.8)
LYMPHOCYTES # BLD AUTO: 0.79 K/UL (ref 1–4.8)
LYMPHOCYTES # BLD AUTO: 0.83 K/UL (ref 1–4.8)
LYMPHOCYTES # BLD AUTO: 0.84 K/UL (ref 1–4.8)
LYMPHOCYTES # BLD AUTO: 1.25 K/UL (ref 1–4.8)
LYMPHOCYTES # BLD AUTO: 1.26 K/UL (ref 1–4.8)
LYMPHOCYTES NFR BLD: 11.3 % (ref 22–41)
LYMPHOCYTES NFR BLD: 11.5 % (ref 22–41)
LYMPHOCYTES NFR BLD: 19.8 % (ref 22–41)
LYMPHOCYTES NFR BLD: 5.9 % (ref 22–41)
LYMPHOCYTES NFR BLD: 7.3 % (ref 22–41)
LYMPHOCYTES NFR BLD: 7.9 % (ref 22–41)
MAGNESIUM SERPL-MCNC: 1.6 MG/DL (ref 1.5–2.5)
MAGNESIUM SERPL-MCNC: 1.8 MG/DL (ref 1.5–2.5)
MAGNESIUM SERPL-MCNC: 2.3 MG/DL (ref 1.5–2.5)
MAGNESIUM SERPL-MCNC: 2.5 MG/DL (ref 1.5–2.5)
MAGNESIUM SERPL-MCNC: 2.6 MG/DL (ref 1.5–2.5)
MANUAL DIFF BLD: NORMAL
MCH RBC QN AUTO: 29.6 PG (ref 27–33)
MCH RBC QN AUTO: 30.1 PG (ref 27–33)
MCH RBC QN AUTO: 30.3 PG (ref 27–33)
MCH RBC QN AUTO: 30.6 PG (ref 27–33)
MCH RBC QN AUTO: 30.7 PG (ref 27–33)
MCH RBC QN AUTO: 30.9 PG (ref 27–33)
MCHC RBC AUTO-ENTMCNC: 30.3 G/DL (ref 33.6–35)
MCHC RBC AUTO-ENTMCNC: 31.6 G/DL (ref 33.6–35)
MCHC RBC AUTO-ENTMCNC: 32 G/DL (ref 33.6–35)
MCHC RBC AUTO-ENTMCNC: 32 G/DL (ref 33.6–35)
MCHC RBC AUTO-ENTMCNC: 32.1 G/DL (ref 33.6–35)
MCHC RBC AUTO-ENTMCNC: 33.2 G/DL (ref 33.6–35)
MCV RBC AUTO: 93 FL (ref 81.4–97.8)
MCV RBC AUTO: 94.6 FL (ref 81.4–97.8)
MCV RBC AUTO: 95.3 FL (ref 81.4–97.8)
MCV RBC AUTO: 95.4 FL (ref 81.4–97.8)
MCV RBC AUTO: 95.8 FL (ref 81.4–97.8)
MCV RBC AUTO: 97.9 FL (ref 81.4–97.8)
METAMYELOCYTES NFR BLD MANUAL: 0.9 %
MICROCYTES BLD QL SMEAR: ABNORMAL
MICROCYTES BLD QL SMEAR: ABNORMAL
MONOCYTES # BLD AUTO: 0.26 K/UL (ref 0–0.85)
MONOCYTES # BLD AUTO: 0.52 K/UL (ref 0–0.85)
MONOCYTES # BLD AUTO: 0.62 K/UL (ref 0–0.85)
MONOCYTES # BLD AUTO: 0.69 K/UL (ref 0–0.85)
MONOCYTES # BLD AUTO: 0.79 K/UL (ref 0–0.85)
MONOCYTES # BLD AUTO: 0.84 K/UL (ref 0–0.85)
MONOCYTES NFR BLD AUTO: 2.3 % (ref 0–13.4)
MONOCYTES NFR BLD AUTO: 5.9 % (ref 0–13.4)
MONOCYTES NFR BLD AUTO: 7.1 % (ref 0–13.4)
MONOCYTES NFR BLD AUTO: 7.4 % (ref 0–13.4)
MONOCYTES NFR BLD AUTO: 7.9 % (ref 0–13.4)
MONOCYTES NFR BLD AUTO: 9.9 % (ref 0–13.4)
MORPHOLOGY BLD-IMP: NORMAL
NEUTROPHILS # BLD AUTO: 10.2 K/UL (ref 2–7.15)
NEUTROPHILS # BLD AUTO: 4.43 K/UL (ref 2–7.15)
NEUTROPHILS # BLD AUTO: 5.88 K/UL (ref 2–7.15)
NEUTROPHILS # BLD AUTO: 8.33 K/UL (ref 2–7.15)
NEUTROPHILS # BLD AUTO: 9.4 K/UL (ref 2–7.15)
NEUTROPHILS # BLD AUTO: 9.66 K/UL (ref 2–7.15)
NEUTROPHILS NFR BLD: 70.3 % (ref 44–72)
NEUTROPHILS NFR BLD: 78.9 % (ref 44–72)
NEUTROPHILS NFR BLD: 80.5 % (ref 44–72)
NEUTROPHILS NFR BLD: 84.4 % (ref 44–72)
NEUTROPHILS NFR BLD: 84.7 % (ref 44–72)
NEUTROPHILS NFR BLD: 87.2 % (ref 44–72)
NEUTS BAND NFR BLD MANUAL: 4.4 % (ref 0–10)
NRBC # BLD AUTO: 0 K/UL
NRBC # BLD AUTO: 0.04 K/UL
NRBC # BLD AUTO: 0.09 K/UL
NRBC BLD-RTO: 0 /100 WBC
NRBC BLD-RTO: 0.6 /100 WBC
NRBC BLD-RTO: 1.2 /100 WBC
O2/TOTAL GAS SETTING VFR VENT: 30 %
O2/TOTAL GAS SETTING VFR VENT: 40 %
OVALOCYTES BLD QL SMEAR: NORMAL
PATHOLOGY CONSULT NOTE: NORMAL
PATHOLOGY CONSULT NOTE: NORMAL
PCO2 BLDA: 38.1 MMHG (ref 26–37)
PCO2 BLDA: 43.1 MMHG (ref 26–37)
PCO2 TEMP ADJ BLDA: 43.1 MMHG (ref 26–37)
PH BLDA: 7.33 [PH] (ref 7.4–7.5)
PH BLDA: 7.42 [PH] (ref 7.4–7.5)
PH TEMP ADJ BLDA: 7.33 [PH] (ref 7.4–7.5)
PHOSPHATE SERPL-MCNC: 2.5 MG/DL (ref 2.5–4.5)
PHOSPHATE SERPL-MCNC: 2.5 MG/DL (ref 2.5–4.5)
PHOSPHATE SERPL-MCNC: 3.1 MG/DL (ref 2.5–4.5)
PHOSPHATE SERPL-MCNC: 4.5 MG/DL (ref 2.5–4.5)
PHOSPHATE SERPL-MCNC: 4.7 MG/DL (ref 2.5–4.5)
PLATELET # BLD AUTO: 190 K/UL (ref 164–446)
PLATELET # BLD AUTO: 199 K/UL (ref 164–446)
PLATELET # BLD AUTO: 201 K/UL (ref 164–446)
PLATELET # BLD AUTO: 227 K/UL (ref 164–446)
PLATELET # BLD AUTO: 271 K/UL (ref 164–446)
PLATELET # BLD AUTO: 327 K/UL (ref 164–446)
PLATELET BLD QL SMEAR: NORMAL
PMV BLD AUTO: 10 FL (ref 9–12.9)
PMV BLD AUTO: 10.1 FL (ref 9–12.9)
PMV BLD AUTO: 10.3 FL (ref 9–12.9)
PMV BLD AUTO: 10.5 FL (ref 9–12.9)
PMV BLD AUTO: 10.7 FL (ref 9–12.9)
PMV BLD AUTO: 9.5 FL (ref 9–12.9)
PO2 BLDA: 132 MMHG (ref 64–87)
PO2 BLDA: 91 MMHG (ref 64–87)
PO2 TEMP ADJ BLDA: 91 MMHG (ref 64–87)
POIKILOCYTOSIS BLD QL SMEAR: NORMAL
POLYCHROMASIA BLD QL SMEAR: NORMAL
POTASSIUM SERPL-SCNC: 3.8 MMOL/L (ref 3.6–5.5)
POTASSIUM SERPL-SCNC: 4.1 MMOL/L (ref 3.6–5.5)
POTASSIUM SERPL-SCNC: 4.4 MMOL/L (ref 3.6–5.5)
POTASSIUM SERPL-SCNC: 4.5 MMOL/L (ref 3.6–5.5)
POTASSIUM SERPL-SCNC: 4.6 MMOL/L (ref 3.6–5.5)
POTASSIUM SERPL-SCNC: 4.6 MMOL/L (ref 3.6–5.5)
POTASSIUM SERPL-SCNC: 4.7 MMOL/L (ref 3.6–5.5)
POTASSIUM SERPL-SCNC: 4.7 MMOL/L (ref 3.6–5.5)
POTASSIUM SERPL-SCNC: 6.3 MMOL/L (ref 3.6–5.5)
PREALB SERPL-MCNC: 14 MG/DL (ref 18–38)
PREALB SERPL-MCNC: 9 MG/DL (ref 18–38)
PRODUCT TYPE UPROD: NORMAL
PROT SERPL-MCNC: 3.7 G/DL (ref 6–8.2)
PROT SERPL-MCNC: 3.7 G/DL (ref 6–8.2)
PROT SERPL-MCNC: 3.9 G/DL (ref 6–8.2)
PROT SERPL-MCNC: 4.3 G/DL (ref 6–8.2)
PROT SERPL-MCNC: 4.9 G/DL (ref 6–8.2)
PROT SERPL-MCNC: 8 G/DL (ref 6–8.2)
RBC # BLD AUTO: 2.33 M/UL (ref 4.2–5.4)
RBC # BLD AUTO: 2.56 M/UL (ref 4.2–5.4)
RBC # BLD AUTO: 2.72 M/UL (ref 4.2–5.4)
RBC # BLD AUTO: 2.87 M/UL (ref 4.2–5.4)
RBC # BLD AUTO: 3.86 M/UL (ref 4.2–5.4)
RBC # BLD AUTO: 5.43 M/UL (ref 4.2–5.4)
RBC BLD AUTO: PRESENT
RH BLD: NORMAL
RH BLD: NORMAL
SAO2 % BLDA: 96 % (ref 93–99)
SAO2 % BLDA: 99 % (ref 93–99)
SCHISTOCYTES BLD QL SMEAR: NORMAL
SMUDGE CELLS BLD QL SMEAR: NORMAL
SODIUM SERPL-SCNC: 138 MMOL/L (ref 135–145)
SODIUM SERPL-SCNC: 139 MMOL/L (ref 135–145)
SODIUM SERPL-SCNC: 140 MMOL/L (ref 135–145)
SODIUM SERPL-SCNC: 140 MMOL/L (ref 135–145)
SODIUM SERPL-SCNC: 141 MMOL/L (ref 135–145)
SODIUM SERPL-SCNC: 142 MMOL/L (ref 135–145)
SODIUM SERPL-SCNC: 143 MMOL/L (ref 135–145)
SODIUM SERPL-SCNC: 146 MMOL/L (ref 135–145)
SODIUM SERPL-SCNC: 148 MMOL/L (ref 135–145)
SPECIMEN DRAWN FROM PATIENT: ABNORMAL
SPECIMEN DRAWN FROM PATIENT: ABNORMAL
TOXIC GRANULES BLD QL SMEAR: SLIGHT
TRIGL SERPL-MCNC: 37 MG/DL (ref 0–149)
TRIGL SERPL-MCNC: 77 MG/DL (ref 0–149)
TROPONIN I SERPL-MCNC: <0.01 NG/ML (ref 0–0.04)
UNIT STATUS USTAT: NORMAL
WBC # BLD AUTO: 10 K/UL (ref 4.8–10.8)
WBC # BLD AUTO: 11.1 K/UL (ref 4.8–10.8)
WBC # BLD AUTO: 11.4 K/UL (ref 4.8–10.8)
WBC # BLD AUTO: 11.7 K/UL (ref 4.8–10.8)
WBC # BLD AUTO: 6.3 K/UL (ref 4.8–10.8)
WBC # BLD AUTO: 7.3 K/UL (ref 4.8–10.8)

## 2018-01-01 PROCEDURE — 700111 HCHG RX REV CODE 636 W/ 250 OVERRIDE (IP): Performed by: SURGERY

## 2018-01-01 PROCEDURE — 37799 UNLISTED PX VASCULAR SURGERY: CPT

## 2018-01-01 PROCEDURE — 700117 HCHG RX CONTRAST REV CODE 255: Performed by: SURGERY

## 2018-01-01 PROCEDURE — 0DBA0ZZ EXCISION OF JEJUNUM, OPEN APPROACH: ICD-10-PCS | Performed by: SURGERY

## 2018-01-01 PROCEDURE — 86901 BLOOD TYPING SEROLOGIC RH(D): CPT

## 2018-01-01 PROCEDURE — 700105 HCHG RX REV CODE 258: Performed by: SURGERY

## 2018-01-01 PROCEDURE — 83605 ASSAY OF LACTIC ACID: CPT

## 2018-01-01 PROCEDURE — 86923 COMPATIBILITY TEST ELECTRIC: CPT

## 2018-01-01 PROCEDURE — 160029 HCHG SURGERY MINUTES - 1ST 30 MINS LEVEL 4: Performed by: SURGERY

## 2018-01-01 PROCEDURE — 82962 GLUCOSE BLOOD TEST: CPT | Mod: 91

## 2018-01-01 PROCEDURE — 80053 COMPREHEN METABOLIC PANEL: CPT

## 2018-01-01 PROCEDURE — 84100 ASSAY OF PHOSPHORUS: CPT

## 2018-01-01 PROCEDURE — 94003 VENT MGMT INPAT SUBQ DAY: CPT

## 2018-01-01 PROCEDURE — 700101 HCHG RX REV CODE 250: Performed by: SURGERY

## 2018-01-01 PROCEDURE — 71045 X-RAY EXAM CHEST 1 VIEW: CPT

## 2018-01-01 PROCEDURE — 700102 HCHG RX REV CODE 250 W/ 637 OVERRIDE(OP): Performed by: SURGERY

## 2018-01-01 PROCEDURE — 160048 HCHG OR STATISTICAL LEVEL 1-5: Performed by: SURGERY

## 2018-01-01 PROCEDURE — 700105 HCHG RX REV CODE 258: Performed by: ANESTHESIOLOGY

## 2018-01-01 PROCEDURE — 97606 NEG PRS WND THER DME>50 SQCM: CPT | Performed by: SURGERY

## 2018-01-01 PROCEDURE — 3E0G76Z INTRODUCTION OF NUTRITIONAL SUBSTANCE INTO UPPER GI, VIA NATURAL OR ARTIFICIAL OPENING: ICD-10-PCS | Performed by: SURGERY

## 2018-01-01 PROCEDURE — 501452 HCHG STAPLES, GIA MULTIFIRE 60/80: Performed by: SURGERY

## 2018-01-01 PROCEDURE — A9270 NON-COVERED ITEM OR SERVICE: HCPCS | Performed by: SURGERY

## 2018-01-01 PROCEDURE — 84478 ASSAY OF TRIGLYCERIDES: CPT

## 2018-01-01 PROCEDURE — 96365 THER/PROPH/DIAG IV INF INIT: CPT

## 2018-01-01 PROCEDURE — 3E033XZ INTRODUCTION OF VASOPRESSOR INTO PERIPHERAL VEIN, PERCUTANEOUS APPROACH: ICD-10-PCS | Performed by: SURGERY

## 2018-01-01 PROCEDURE — 700101 HCHG RX REV CODE 250

## 2018-01-01 PROCEDURE — A7000 DISPOSABLE CANISTER FOR PUMP: HCPCS | Performed by: SURGERY

## 2018-01-01 PROCEDURE — 86850 RBC ANTIBODY SCREEN: CPT

## 2018-01-01 PROCEDURE — 700101 HCHG RX REV CODE 250: Performed by: EMERGENCY MEDICINE

## 2018-01-01 PROCEDURE — 99292 CRITICAL CARE ADDL 30 MIN: CPT | Mod: 24 | Performed by: SURGERY

## 2018-01-01 PROCEDURE — 36415 COLL VENOUS BLD VENIPUNCTURE: CPT

## 2018-01-01 PROCEDURE — 85027 COMPLETE CBC AUTOMATED: CPT

## 2018-01-01 PROCEDURE — 502704 HCHG DEVICE, LIGASURE IMPACT: Performed by: SURGERY

## 2018-01-01 PROCEDURE — 99291 CRITICAL CARE FIRST HOUR: CPT | Performed by: SURGERY

## 2018-01-01 PROCEDURE — 700111 HCHG RX REV CODE 636 W/ 250 OVERRIDE (IP)

## 2018-01-01 PROCEDURE — 99291 CRITICAL CARE FIRST HOUR: CPT | Mod: 24 | Performed by: SURGERY

## 2018-01-01 PROCEDURE — 82962 GLUCOSE BLOOD TEST: CPT

## 2018-01-01 PROCEDURE — 86140 C-REACTIVE PROTEIN: CPT

## 2018-01-01 PROCEDURE — 501433 HCHG STAPLER, GIA MULTIFIRE 60/80: Performed by: SURGERY

## 2018-01-01 PROCEDURE — 30233N1 TRANSFUSION OF NONAUTOLOGOUS RED BLOOD CELLS INTO PERIPHERAL VEIN, PERCUTANEOUS APPROACH: ICD-10-PCS | Performed by: SURGERY

## 2018-01-01 PROCEDURE — 501460 HCHG STAPLER, TA55 35LD: Performed by: SURGERY

## 2018-01-01 PROCEDURE — 501445 HCHG STAPLER, SKIN DISP: Performed by: SURGERY

## 2018-01-01 PROCEDURE — 99291 CRITICAL CARE FIRST HOUR: CPT | Mod: 57 | Performed by: SURGERY

## 2018-01-01 PROCEDURE — 96375 TX/PRO/DX INJ NEW DRUG ADDON: CPT

## 2018-01-01 PROCEDURE — 96376 TX/PRO/DX INJ SAME DRUG ADON: CPT

## 2018-01-01 PROCEDURE — 85025 COMPLETE CBC W/AUTO DIFF WBC: CPT

## 2018-01-01 PROCEDURE — 0DN80ZZ RELEASE SMALL INTESTINE, OPEN APPROACH: ICD-10-PCS | Performed by: SURGERY

## 2018-01-01 PROCEDURE — 83690 ASSAY OF LIPASE: CPT

## 2018-01-01 PROCEDURE — 82803 BLOOD GASES ANY COMBINATION: CPT

## 2018-01-01 PROCEDURE — 700111 HCHG RX REV CODE 636 W/ 250 OVERRIDE (IP): Performed by: EMERGENCY MEDICINE

## 2018-01-01 PROCEDURE — 770022 HCHG ROOM/CARE - ICU (200)

## 2018-01-01 PROCEDURE — 93306 TTE W/DOPPLER COMPLETE: CPT

## 2018-01-01 PROCEDURE — 83735 ASSAY OF MAGNESIUM: CPT

## 2018-01-01 PROCEDURE — 501838 HCHG SUTURE GENERAL: Performed by: SURGERY

## 2018-01-01 PROCEDURE — 84484 ASSAY OF TROPONIN QUANT: CPT

## 2018-01-01 PROCEDURE — 85007 BL SMEAR W/DIFF WBC COUNT: CPT

## 2018-01-01 PROCEDURE — 700105 HCHG RX REV CODE 258: Performed by: EMERGENCY MEDICINE

## 2018-01-01 PROCEDURE — 80048 BASIC METABOLIC PNL TOTAL CA: CPT

## 2018-01-01 PROCEDURE — 44120 REMOVAL OF SMALL INTESTINE: CPT | Performed by: SURGERY

## 2018-01-01 PROCEDURE — 94150 VITAL CAPACITY TEST: CPT

## 2018-01-01 PROCEDURE — 502240 HCHG MISC OR SUPPLY RC 0272: Performed by: SURGERY

## 2018-01-01 PROCEDURE — 84134 ASSAY OF PREALBUMIN: CPT

## 2018-01-01 PROCEDURE — 160009 HCHG ANES TIME/MIN: Performed by: SURGERY

## 2018-01-01 PROCEDURE — 5A1955Z RESPIRATORY VENTILATION, GREATER THAN 96 CONSECUTIVE HOURS: ICD-10-PCS | Performed by: SURGERY

## 2018-01-01 PROCEDURE — 88307 TISSUE EXAM BY PATHOLOGIST: CPT

## 2018-01-01 PROCEDURE — P9016 RBC LEUKOCYTES REDUCED: HCPCS

## 2018-01-01 PROCEDURE — 700101 HCHG RX REV CODE 250: Mod: JW

## 2018-01-01 PROCEDURE — 36600 WITHDRAWAL OF ARTERIAL BLOOD: CPT

## 2018-01-01 PROCEDURE — 160041 HCHG SURGERY MINUTES - EA ADDL 1 MIN LEVEL 4: Performed by: SURGERY

## 2018-01-01 PROCEDURE — 93306 TTE W/DOPPLER COMPLETE: CPT | Mod: 26 | Performed by: INTERNAL MEDICINE

## 2018-01-01 PROCEDURE — 70450 CT HEAD/BRAIN W/O DYE: CPT

## 2018-01-01 PROCEDURE — C1765 ADHESION BARRIER: HCPCS | Performed by: SURGERY

## 2018-01-01 PROCEDURE — 94002 VENT MGMT INPAT INIT DAY: CPT

## 2018-01-01 PROCEDURE — 501488 HCHG SUCTION CANN, WOUNDVAC TRAC: Performed by: SURGERY

## 2018-01-01 PROCEDURE — 36430 TRANSFUSION BLD/BLD COMPNT: CPT

## 2018-01-01 PROCEDURE — 74177 CT ABD & PELVIS W/CONTRAST: CPT

## 2018-01-01 PROCEDURE — 700117 HCHG RX CONTRAST REV CODE 255: Performed by: EMERGENCY MEDICINE

## 2018-01-01 PROCEDURE — 83880 ASSAY OF NATRIURETIC PEPTIDE: CPT

## 2018-01-01 PROCEDURE — 700112 HCHG RX REV CODE 229: Performed by: SURGERY

## 2018-01-01 PROCEDURE — 700111 HCHG RX REV CODE 636 W/ 250 OVERRIDE (IP): Performed by: ANESTHESIOLOGY

## 2018-01-01 PROCEDURE — 87040 BLOOD CULTURE FOR BACTERIA: CPT

## 2018-01-01 PROCEDURE — 51798 US URINE CAPACITY MEASURE: CPT

## 2018-01-01 PROCEDURE — 99291 CRITICAL CARE FIRST HOUR: CPT | Mod: 25 | Performed by: SURGERY

## 2018-01-01 PROCEDURE — 99291 CRITICAL CARE FIRST HOUR: CPT

## 2018-01-01 PROCEDURE — 86900 BLOOD TYPING SEROLOGIC ABO: CPT

## 2018-01-01 PROCEDURE — 71275 CT ANGIOGRAPHY CHEST: CPT

## 2018-01-01 RX ORDER — AMOXICILLIN 250 MG
1 CAPSULE ORAL NIGHTLY
Status: DISCONTINUED | OUTPATIENT
Start: 2018-01-01 | End: 2018-01-01

## 2018-01-01 RX ORDER — HYDRALAZINE HYDROCHLORIDE 20 MG/ML
10 INJECTION INTRAMUSCULAR; INTRAVENOUS
Status: DISCONTINUED | OUTPATIENT
Start: 2018-01-01 | End: 2018-01-01

## 2018-01-01 RX ORDER — OXYCODONE HYDROCHLORIDE 5 MG/1
2.5 TABLET ORAL
Status: DISCONTINUED | OUTPATIENT
Start: 2018-01-01 | End: 2018-01-01

## 2018-01-01 RX ORDER — SODIUM CHLORIDE 9 MG/ML
500 INJECTION, SOLUTION INTRAVENOUS ONCE
Status: COMPLETED | OUTPATIENT
Start: 2018-01-01 | End: 2018-01-01

## 2018-01-01 RX ORDER — DEXTROSE MONOHYDRATE 25 G/50ML
25 INJECTION, SOLUTION INTRAVENOUS
Status: DISCONTINUED | OUTPATIENT
Start: 2018-01-01 | End: 2018-01-01

## 2018-01-01 RX ORDER — OXYCODONE HYDROCHLORIDE 5 MG/1
5 TABLET ORAL
Status: DISCONTINUED | OUTPATIENT
Start: 2018-01-01 | End: 2018-01-01

## 2018-01-01 RX ORDER — HEPARIN SODIUM 5000 [USP'U]/ML
5000 INJECTION, SOLUTION INTRAVENOUS; SUBCUTANEOUS EVERY 8 HOURS
Status: DISCONTINUED | OUTPATIENT
Start: 2018-01-01 | End: 2018-01-01

## 2018-01-01 RX ORDER — LORAZEPAM 2 MG/ML
INJECTION INTRAMUSCULAR
Status: DISCONTINUED
Start: 2018-01-01 | End: 2018-01-01 | Stop reason: HOSPADM

## 2018-01-01 RX ORDER — DEXTROSE, SODIUM CHLORIDE, SODIUM LACTATE, POTASSIUM CHLORIDE, AND CALCIUM CHLORIDE 5; .6; .31; .03; .02 G/100ML; G/100ML; G/100ML; G/100ML; G/100ML
INJECTION, SOLUTION INTRAVENOUS CONTINUOUS
Status: DISCONTINUED | OUTPATIENT
Start: 2018-01-01 | End: 2018-01-01

## 2018-01-01 RX ORDER — FAMOTIDINE 20 MG/1
20 TABLET, FILM COATED ORAL 2 TIMES DAILY
Status: DISCONTINUED | OUTPATIENT
Start: 2018-01-01 | End: 2018-01-01

## 2018-01-01 RX ORDER — CALCIUM GLUCONATE 94 MG/ML
2 INJECTION, SOLUTION INTRAVENOUS ONCE
Status: DISCONTINUED | OUTPATIENT
Start: 2018-01-01 | End: 2018-01-01

## 2018-01-01 RX ORDER — HALOPERIDOL 5 MG/ML
5 INJECTION INTRAMUSCULAR EVERY 4 HOURS PRN
Status: DISCONTINUED | OUTPATIENT
Start: 2018-01-01 | End: 2018-01-01

## 2018-01-01 RX ORDER — MAGNESIUM SULFATE HEPTAHYDRATE 40 MG/ML
2 INJECTION, SOLUTION INTRAVENOUS ONCE
Status: COMPLETED | OUTPATIENT
Start: 2018-01-01 | End: 2018-01-01

## 2018-01-01 RX ORDER — BISACODYL 10 MG
10 SUPPOSITORY, RECTAL RECTAL
Status: DISCONTINUED | OUTPATIENT
Start: 2018-01-01 | End: 2018-01-01

## 2018-01-01 RX ORDER — LORAZEPAM 2 MG/ML
1 CONCENTRATE ORAL
Status: DISCONTINUED | OUTPATIENT
Start: 2018-01-01 | End: 2018-01-01

## 2018-01-01 RX ORDER — METRONIDAZOLE 500 MG/1
500 TABLET ORAL EVERY 8 HOURS
Status: DISCONTINUED | OUTPATIENT
Start: 2018-01-01 | End: 2018-01-01

## 2018-01-01 RX ORDER — SODIUM CHLORIDE 9 MG/ML
1000 INJECTION, SOLUTION INTRAVENOUS ONCE
Status: COMPLETED | OUTPATIENT
Start: 2018-01-01 | End: 2018-01-01

## 2018-01-01 RX ORDER — SODIUM CHLORIDE, SODIUM LACTATE, POTASSIUM CHLORIDE, CALCIUM CHLORIDE 600; 310; 30; 20 MG/100ML; MG/100ML; MG/100ML; MG/100ML
500 INJECTION, SOLUTION INTRAVENOUS CONTINUOUS
Status: DISCONTINUED | OUTPATIENT
Start: 2018-01-01 | End: 2018-01-01

## 2018-01-01 RX ORDER — POTASSIUM CHLORIDE 7.45 MG/ML
10 INJECTION INTRAVENOUS ONCE
Status: COMPLETED | OUTPATIENT
Start: 2018-01-01 | End: 2018-01-01

## 2018-01-01 RX ORDER — GLYCOPYRROLATE 1 MG/1
1 TABLET ORAL 3 TIMES DAILY PRN
Status: DISCONTINUED | OUTPATIENT
Start: 2018-01-01 | End: 2018-01-01 | Stop reason: HOSPADM

## 2018-01-01 RX ORDER — HYDROMORPHONE HYDROCHLORIDE 2 MG/ML
4 INJECTION, SOLUTION INTRAMUSCULAR; INTRAVENOUS; SUBCUTANEOUS
Status: DISCONTINUED | OUTPATIENT
Start: 2018-01-01 | End: 2018-01-01 | Stop reason: HOSPADM

## 2018-01-01 RX ORDER — DEXTROSE AND SODIUM CHLORIDE 5; .9 G/100ML; G/100ML
INJECTION, SOLUTION INTRAVENOUS CONTINUOUS
Status: ACTIVE | OUTPATIENT
Start: 2018-01-01 | End: 2018-01-01

## 2018-01-01 RX ORDER — MAGNESIUM HYDROXIDE 1200 MG/15ML
LIQUID ORAL
Status: COMPLETED | OUTPATIENT
Start: 2018-01-01 | End: 2018-01-01

## 2018-01-01 RX ORDER — SODIUM CHLORIDE 9 MG/ML
1000 INJECTION, SOLUTION INTRAVENOUS
Status: COMPLETED | OUTPATIENT
Start: 2018-01-01 | End: 2018-01-01

## 2018-01-01 RX ORDER — AMOXICILLIN 250 MG
1 CAPSULE ORAL
Status: DISCONTINUED | OUTPATIENT
Start: 2018-01-01 | End: 2018-01-01

## 2018-01-01 RX ORDER — SODIUM CHLORIDE, SODIUM LACTATE, POTASSIUM CHLORIDE, CALCIUM CHLORIDE 600; 310; 30; 20 MG/100ML; MG/100ML; MG/100ML; MG/100ML
500 INJECTION, SOLUTION INTRAVENOUS ONCE
Status: DISCONTINUED | OUTPATIENT
Start: 2018-01-01 | End: 2018-01-01

## 2018-01-01 RX ORDER — TIOTROPIUM BROMIDE 18 UG/1
1 CAPSULE ORAL; RESPIRATORY (INHALATION) DAILY
Status: DISCONTINUED | OUTPATIENT
Start: 2018-01-01 | End: 2018-01-01

## 2018-01-01 RX ORDER — ATROPINE SULFATE 10 MG/ML
2 SOLUTION/ DROPS OPHTHALMIC EVERY 4 HOURS PRN
Status: DISCONTINUED | OUTPATIENT
Start: 2018-01-01 | End: 2018-01-01 | Stop reason: HOSPADM

## 2018-01-01 RX ORDER — LABETALOL HYDROCHLORIDE 5 MG/ML
10 INJECTION, SOLUTION INTRAVENOUS
Status: DISCONTINUED | OUTPATIENT
Start: 2018-01-01 | End: 2018-01-01

## 2018-01-01 RX ORDER — ONDANSETRON 2 MG/ML
4 INJECTION INTRAMUSCULAR; INTRAVENOUS ONCE
Status: COMPLETED | OUTPATIENT
Start: 2018-01-01 | End: 2018-01-01

## 2018-01-01 RX ORDER — ANASTROZOLE 1 MG/1
1 TABLET ORAL DAILY
COMMUNITY

## 2018-01-01 RX ORDER — LORAZEPAM 2 MG/ML
1 INJECTION INTRAMUSCULAR
Status: DISCONTINUED | OUTPATIENT
Start: 2018-01-01 | End: 2018-01-01

## 2018-01-01 RX ORDER — LORAZEPAM 2 MG/ML
1 INJECTION INTRAMUSCULAR
Status: DISCONTINUED | OUTPATIENT
Start: 2018-01-01 | End: 2018-01-01 | Stop reason: HOSPADM

## 2018-01-01 RX ORDER — LORAZEPAM 2 MG/ML
1 CONCENTRATE ORAL
Status: DISCONTINUED | OUTPATIENT
Start: 2018-01-01 | End: 2018-01-01 | Stop reason: HOSPADM

## 2018-01-01 RX ORDER — KETOROLAC TROMETHAMINE 30 MG/ML
15 INJECTION, SOLUTION INTRAMUSCULAR; INTRAVENOUS EVERY 6 HOURS
Status: DISPENSED | OUTPATIENT
Start: 2018-01-01 | End: 2018-01-01

## 2018-01-01 RX ORDER — ENEMA 19; 7 G/133ML; G/133ML
1 ENEMA RECTAL
Status: DISCONTINUED | OUTPATIENT
Start: 2018-01-01 | End: 2018-01-01

## 2018-01-01 RX ORDER — SODIUM CHLORIDE 9 MG/ML
INJECTION, SOLUTION INTRAVENOUS CONTINUOUS
Status: DISCONTINUED | OUTPATIENT
Start: 2018-01-01 | End: 2018-01-01

## 2018-01-01 RX ORDER — BUDESONIDE AND FORMOTEROL FUMARATE DIHYDRATE 160; 4.5 UG/1; UG/1
2 AEROSOL RESPIRATORY (INHALATION)
Status: DISCONTINUED | OUTPATIENT
Start: 2018-01-01 | End: 2018-01-01

## 2018-01-01 RX ORDER — HYDROMORPHONE HYDROCHLORIDE 2 MG/ML
4 INJECTION, SOLUTION INTRAMUSCULAR; INTRAVENOUS; SUBCUTANEOUS
Status: DISCONTINUED | OUTPATIENT
Start: 2018-01-01 | End: 2018-01-01

## 2018-01-01 RX ORDER — SODIUM CHLORIDE, SODIUM LACTATE, POTASSIUM CHLORIDE, CALCIUM CHLORIDE 600; 310; 30; 20 MG/100ML; MG/100ML; MG/100ML; MG/100ML
250 INJECTION, SOLUTION INTRAVENOUS ONCE
Status: COMPLETED | OUTPATIENT
Start: 2018-01-01 | End: 2018-01-01

## 2018-01-01 RX ORDER — ACETAMINOPHEN 325 MG/1
650 TABLET ORAL EVERY 6 HOURS
Status: DISCONTINUED | OUTPATIENT
Start: 2018-01-01 | End: 2018-01-01

## 2018-01-01 RX ORDER — SODIUM CHLORIDE, SODIUM LACTATE, POTASSIUM CHLORIDE, CALCIUM CHLORIDE 600; 310; 30; 20 MG/100ML; MG/100ML; MG/100ML; MG/100ML
INJECTION, SOLUTION INTRAVENOUS CONTINUOUS
Status: DISCONTINUED | OUTPATIENT
Start: 2018-01-01 | End: 2018-01-01

## 2018-01-01 RX ORDER — ONDANSETRON 2 MG/ML
4 INJECTION INTRAMUSCULAR; INTRAVENOUS EVERY 4 HOURS PRN
Status: DISCONTINUED | OUTPATIENT
Start: 2018-01-01 | End: 2018-01-01 | Stop reason: HOSPADM

## 2018-01-01 RX ORDER — POLYETHYLENE GLYCOL 3350 17 G/17G
1 POWDER, FOR SOLUTION ORAL 2 TIMES DAILY
Status: DISCONTINUED | OUTPATIENT
Start: 2018-01-01 | End: 2018-01-01

## 2018-01-01 RX ORDER — IPRATROPIUM BROMIDE AND ALBUTEROL SULFATE 2.5; .5 MG/3ML; MG/3ML
3 SOLUTION RESPIRATORY (INHALATION)
Status: DISCONTINUED | OUTPATIENT
Start: 2018-01-01 | End: 2018-01-01 | Stop reason: HOSPADM

## 2018-01-01 RX ORDER — CYANOCOBALAMIN 1000 UG/ML
1000 INJECTION, SOLUTION INTRAMUSCULAR; SUBCUTANEOUS ONCE
Status: COMPLETED | OUTPATIENT
Start: 2018-01-01 | End: 2018-01-01

## 2018-01-01 RX ORDER — DOCUSATE SODIUM 100 MG/1
100 CAPSULE, LIQUID FILLED ORAL 2 TIMES DAILY
Status: DISCONTINUED | OUTPATIENT
Start: 2018-01-01 | End: 2018-01-01

## 2018-01-01 RX ORDER — RANITIDINE 150 MG/1
150 TABLET ORAL 2 TIMES DAILY
COMMUNITY

## 2018-01-01 RX ORDER — POLYETHYLENE GLYCOL 3350 17 G/17G
17 POWDER, FOR SOLUTION ORAL DAILY
COMMUNITY

## 2018-01-01 RX ADMIN — ACETAMINOPHEN 650 MG: 325 TABLET, FILM COATED ORAL at 17:31

## 2018-01-01 RX ADMIN — VASOPRESSIN 0.03 UNITS/MIN: 20 INJECTION INTRAVENOUS at 02:00

## 2018-01-01 RX ADMIN — ACETAMINOPHEN 650 MG: 325 TABLET, FILM COATED ORAL at 17:24

## 2018-01-01 RX ADMIN — SODIUM CHLORIDE 1000 ML: 9 INJECTION, SOLUTION INTRAVENOUS at 00:15

## 2018-01-01 RX ADMIN — CEFTRIAXONE SODIUM 1 G: 1 INJECTION, POWDER, FOR SOLUTION INTRAMUSCULAR; INTRAVENOUS at 05:40

## 2018-01-01 RX ADMIN — ACETAMINOPHEN 650 MG: 325 TABLET, FILM COATED ORAL at 23:32

## 2018-01-01 RX ADMIN — DEXTROSE MONOHYDRATE 25 ML: 500 INJECTION PARENTERAL at 06:53

## 2018-01-01 RX ADMIN — POTASSIUM CHLORIDE 10 MEQ: 7.46 INJECTION, SOLUTION INTRAVENOUS at 08:20

## 2018-01-01 RX ADMIN — SODIUM CHLORIDE: 9 INJECTION, SOLUTION INTRAVENOUS at 15:40

## 2018-01-01 RX ADMIN — STANDARDIZED SENNA CONCENTRATE AND DOCUSATE SODIUM 1 TABLET: 8.6; 5 TABLET, FILM COATED ORAL at 20:21

## 2018-01-01 RX ADMIN — SODIUM CHLORIDE, POTASSIUM CHLORIDE, SODIUM LACTATE AND CALCIUM CHLORIDE 250 ML: 600; 310; 30; 20 INJECTION, SOLUTION INTRAVENOUS at 09:34

## 2018-01-01 RX ADMIN — KETOROLAC TROMETHAMINE 15 MG: 30 INJECTION, SOLUTION INTRAMUSCULAR at 09:25

## 2018-01-01 RX ADMIN — KETOROLAC TROMETHAMINE 15 MG: 30 INJECTION, SOLUTION INTRAMUSCULAR at 20:42

## 2018-01-01 RX ADMIN — FENTANYL CITRATE 50 MCG: 50 INJECTION, SOLUTION INTRAMUSCULAR; INTRAVENOUS at 21:48

## 2018-01-01 RX ADMIN — SODIUM CHLORIDE: 9 INJECTION, SOLUTION INTRAVENOUS at 10:20

## 2018-01-01 RX ADMIN — POLYETHYLENE GLYCOL 3350 1 PACKET: 17 POWDER, FOR SOLUTION ORAL at 17:24

## 2018-01-01 RX ADMIN — METRONIDAZOLE 500 MG: 500 TABLET ORAL at 13:22

## 2018-01-01 RX ADMIN — METRONIDAZOLE 500 MG: 500 TABLET ORAL at 05:12

## 2018-01-01 RX ADMIN — FAMOTIDINE 20 MG: 20 TABLET, FILM COATED ORAL at 05:11

## 2018-01-01 RX ADMIN — KETOROLAC TROMETHAMINE 15 MG: 30 INJECTION, SOLUTION INTRAMUSCULAR at 20:21

## 2018-01-01 RX ADMIN — KETOROLAC TROMETHAMINE 15 MG: 30 INJECTION, SOLUTION INTRAMUSCULAR at 01:34

## 2018-01-01 RX ADMIN — METRONIDAZOLE 500 MG: 500 INJECTION, SOLUTION INTRAVENOUS at 05:40

## 2018-01-01 RX ADMIN — SODIUM CHLORIDE 500 ML: 9 INJECTION, SOLUTION INTRAVENOUS at 12:38

## 2018-01-01 RX ADMIN — HEPARIN SODIUM 5000 UNITS: 5000 INJECTION, SOLUTION INTRAVENOUS; SUBCUTANEOUS at 21:27

## 2018-01-01 RX ADMIN — SODIUM CHLORIDE 500 ML: 9 INJECTION, SOLUTION INTRAVENOUS at 01:40

## 2018-01-01 RX ADMIN — DEXTROSE AND SODIUM CHLORIDE: 5; 900 INJECTION, SOLUTION INTRAVENOUS at 12:53

## 2018-01-01 RX ADMIN — SODIUM CHLORIDE 500 ML: 9 INJECTION, SOLUTION INTRAVENOUS at 22:27

## 2018-01-01 RX ADMIN — METRONIDAZOLE 500 MG: 500 TABLET ORAL at 22:31

## 2018-01-01 RX ADMIN — PROPOFOL 10 MCG/KG/MIN: 10 INJECTION, EMULSION INTRAVENOUS at 02:29

## 2018-01-01 RX ADMIN — THIAMINE HYDROCHLORIDE 100 MG: 100 INJECTION, SOLUTION INTRAMUSCULAR; INTRAVENOUS at 07:12

## 2018-01-01 RX ADMIN — SODIUM CHLORIDE, POTASSIUM CHLORIDE, SODIUM LACTATE AND CALCIUM CHLORIDE: 600; 310; 30; 20 INJECTION, SOLUTION INTRAVENOUS at 09:34

## 2018-01-01 RX ADMIN — SODIUM CHLORIDE 500 ML: 9 INJECTION, SOLUTION INTRAVENOUS at 06:04

## 2018-01-01 RX ADMIN — SODIUM CHLORIDE: 9 INJECTION, SOLUTION INTRAVENOUS at 08:41

## 2018-01-01 RX ADMIN — ACETAMINOPHEN 650 MG: 325 TABLET, FILM COATED ORAL at 05:55

## 2018-01-01 RX ADMIN — CEFTRIAXONE SODIUM 1 G: 1 INJECTION, POWDER, FOR SOLUTION INTRAMUSCULAR; INTRAVENOUS at 05:49

## 2018-01-01 RX ADMIN — Medication 50 MCG/HR: at 23:16

## 2018-01-01 RX ADMIN — LORAZEPAM 1 MG: 2 INJECTION INTRAMUSCULAR; INTRAVENOUS at 17:27

## 2018-01-01 RX ADMIN — ACETAMINOPHEN 650 MG: 325 TABLET, FILM COATED ORAL at 13:22

## 2018-01-01 RX ADMIN — SODIUM CHLORIDE: 9 INJECTION, SOLUTION INTRAVENOUS at 16:25

## 2018-01-01 RX ADMIN — POLYETHYLENE GLYCOL 3350 1 PACKET: 17 POWDER, FOR SOLUTION ORAL at 17:37

## 2018-01-01 RX ADMIN — FAMOTIDINE 20 MG: 10 INJECTION INTRAVENOUS at 05:05

## 2018-01-01 RX ADMIN — METRONIDAZOLE 500 MG: 500 TABLET ORAL at 05:49

## 2018-01-01 RX ADMIN — KETOROLAC TROMETHAMINE 15 MG: 30 INJECTION, SOLUTION INTRAMUSCULAR at 04:42

## 2018-01-01 RX ADMIN — POLYETHYLENE GLYCOL 3350 1 PACKET: 17 POWDER, FOR SOLUTION ORAL at 05:54

## 2018-01-01 RX ADMIN — SODIUM CHLORIDE, POTASSIUM CHLORIDE, SODIUM LACTATE AND CALCIUM CHLORIDE 500 ML: 600; 310; 30; 20 INJECTION, SOLUTION INTRAVENOUS at 11:00

## 2018-01-01 RX ADMIN — ENOXAPARIN SODIUM 40 MG: 100 INJECTION SUBCUTANEOUS at 05:04

## 2018-01-01 RX ADMIN — CALCIUM GLUCONATE 2 G: 98 INJECTION, SOLUTION INTRAVENOUS at 09:04

## 2018-01-01 RX ADMIN — METRONIDAZOLE 500 MG: 500 TABLET ORAL at 23:14

## 2018-01-01 RX ADMIN — FENTANYL CITRATE 25 MCG: 50 INJECTION, SOLUTION INTRAMUSCULAR; INTRAVENOUS at 13:37

## 2018-01-01 RX ADMIN — FENTANYL CITRATE 100 MCG: 50 INJECTION, SOLUTION INTRAMUSCULAR; INTRAVENOUS at 14:01

## 2018-01-01 RX ADMIN — MAGNESIUM HYDROXIDE 30 ML: 400 SUSPENSION ORAL at 05:54

## 2018-01-01 RX ADMIN — FAMOTIDINE 20 MG: 10 INJECTION INTRAVENOUS at 05:40

## 2018-01-01 RX ADMIN — FAMOTIDINE 20 MG: 20 TABLET, FILM COATED ORAL at 17:31

## 2018-01-01 RX ADMIN — FENTANYL CITRATE 50 MCG: 50 INJECTION, SOLUTION INTRAMUSCULAR; INTRAVENOUS at 21:07

## 2018-01-01 RX ADMIN — Medication 50 MCG/HR: at 01:44

## 2018-01-01 RX ADMIN — ACETAMINOPHEN 650 MG: 325 TABLET, FILM COATED ORAL at 05:12

## 2018-01-01 RX ADMIN — THIAMINE HYDROCHLORIDE 100 MG: 100 INJECTION, SOLUTION INTRAMUSCULAR; INTRAVENOUS at 05:45

## 2018-01-01 RX ADMIN — METRONIDAZOLE 500 MG: 500 INJECTION, SOLUTION INTRAVENOUS at 04:45

## 2018-01-01 RX ADMIN — ACETAMINOPHEN 650 MG: 325 TABLET, FILM COATED ORAL at 11:55

## 2018-01-01 RX ADMIN — SODIUM CHLORIDE 1000 ML: 9 INJECTION, SOLUTION INTRAVENOUS at 20:18

## 2018-01-01 RX ADMIN — SODIUM CHLORIDE: 9 INJECTION, SOLUTION INTRAVENOUS at 02:39

## 2018-01-01 RX ADMIN — THIAMINE HYDROCHLORIDE 100 MG: 100 INJECTION, SOLUTION INTRAMUSCULAR; INTRAVENOUS at 06:25

## 2018-01-01 RX ADMIN — SODIUM CHLORIDE: 9 INJECTION, SOLUTION INTRAVENOUS at 01:39

## 2018-01-01 RX ADMIN — SODIUM CHLORIDE 1000 ML: 9 INJECTION, SOLUTION INTRAVENOUS at 08:25

## 2018-01-01 RX ADMIN — ONDANSETRON 4 MG: 2 INJECTION INTRAMUSCULAR; INTRAVENOUS at 21:07

## 2018-01-01 RX ADMIN — PROPOFOL 10 MCG/KG/MIN: 10 INJECTION, EMULSION INTRAVENOUS at 15:32

## 2018-01-01 RX ADMIN — METRONIDAZOLE 500 MG: 500 TABLET ORAL at 22:17

## 2018-01-01 RX ADMIN — SODIUM CHLORIDE: 9 INJECTION, SOLUTION INTRAVENOUS at 09:00

## 2018-01-01 RX ADMIN — SODIUM CHLORIDE 1000 ML: 9 INJECTION, SOLUTION INTRAVENOUS at 21:07

## 2018-01-01 RX ADMIN — HEPARIN SODIUM 5000 UNITS: 5000 INJECTION, SOLUTION INTRAVENOUS; SUBCUTANEOUS at 13:22

## 2018-01-01 RX ADMIN — METRONIDAZOLE 500 MG: 500 INJECTION, SOLUTION INTRAVENOUS at 20:22

## 2018-01-01 RX ADMIN — DEXTROSE MONOHYDRATE 25 ML: 500 INJECTION PARENTERAL at 18:33

## 2018-01-01 RX ADMIN — CALCIUM GLUCONATE: 98 INJECTION, SOLUTION INTRAVENOUS at 21:17

## 2018-01-01 RX ADMIN — ACETAMINOPHEN 650 MG: 325 TABLET, FILM COATED ORAL at 12:49

## 2018-01-01 RX ADMIN — STANDARDIZED SENNA CONCENTRATE AND DOCUSATE SODIUM 1 TABLET: 8.6; 5 TABLET, FILM COATED ORAL at 21:27

## 2018-01-01 RX ADMIN — METRONIDAZOLE 500 MG: 500 INJECTION, SOLUTION INTRAVENOUS at 14:12

## 2018-01-01 RX ADMIN — PROPOFOL 20 MCG/KG/MIN: 10 INJECTION, EMULSION INTRAVENOUS at 16:22

## 2018-01-01 RX ADMIN — DEXTROSE MONOHYDRATE 25 ML: 500 INJECTION PARENTERAL at 06:23

## 2018-01-01 RX ADMIN — ACETAMINOPHEN 650 MG: 325 TABLET, FILM COATED ORAL at 00:24

## 2018-01-01 RX ADMIN — KETOROLAC TROMETHAMINE 15 MG: 30 INJECTION, SOLUTION INTRAMUSCULAR at 09:05

## 2018-01-01 RX ADMIN — DEXTROSE MONOHYDRATE 25 ML: 500 INJECTION PARENTERAL at 12:28

## 2018-01-01 RX ADMIN — FAMOTIDINE 20 MG: 20 TABLET, FILM COATED ORAL at 17:24

## 2018-01-01 RX ADMIN — HYDROMORPHONE HYDROCHLORIDE 4 MG: 2 INJECTION INTRAMUSCULAR; INTRAVENOUS; SUBCUTANEOUS at 16:51

## 2018-01-01 RX ADMIN — ACETAMINOPHEN 650 MG: 325 TABLET, FILM COATED ORAL at 23:15

## 2018-01-01 RX ADMIN — ACETAMINOPHEN 650 MG: 325 TABLET, FILM COATED ORAL at 17:37

## 2018-01-01 RX ADMIN — HEPARIN SODIUM 5000 UNITS: 5000 INJECTION, SOLUTION INTRAVENOUS; SUBCUTANEOUS at 12:50

## 2018-01-01 RX ADMIN — FAMOTIDINE 20 MG: 10 INJECTION INTRAVENOUS at 18:35

## 2018-01-01 RX ADMIN — FAMOTIDINE 20 MG: 10 INJECTION INTRAVENOUS at 05:49

## 2018-01-01 RX ADMIN — Medication 25 MCG/HR: at 03:42

## 2018-01-01 RX ADMIN — METRONIDAZOLE 500 MG: 500 TABLET ORAL at 14:05

## 2018-01-01 RX ADMIN — ENOXAPARIN SODIUM 40 MG: 100 INJECTION SUBCUTANEOUS at 05:40

## 2018-01-01 RX ADMIN — MAGNESIUM HYDROXIDE 30 ML: 400 SUSPENSION ORAL at 05:12

## 2018-01-01 RX ADMIN — KETOROLAC TROMETHAMINE 15 MG: 30 INJECTION, SOLUTION INTRAMUSCULAR at 14:01

## 2018-01-01 RX ADMIN — CEFTRIAXONE SODIUM 1 G: 1 INJECTION, POWDER, FOR SOLUTION INTRAMUSCULAR; INTRAVENOUS at 06:48

## 2018-01-01 RX ADMIN — STANDARDIZED SENNA CONCENTRATE AND DOCUSATE SODIUM 1 TABLET: 8.6; 5 TABLET, FILM COATED ORAL at 22:16

## 2018-01-01 RX ADMIN — LORAZEPAM 1 MG: 2 INJECTION INTRAMUSCULAR; INTRAVENOUS at 16:51

## 2018-01-01 RX ADMIN — HEPARIN SODIUM 5000 UNITS: 5000 INJECTION, SOLUTION INTRAVENOUS; SUBCUTANEOUS at 05:55

## 2018-01-01 RX ADMIN — DOCUSATE SODIUM 100 MG: 100 CAPSULE, LIQUID FILLED ORAL at 05:40

## 2018-01-01 RX ADMIN — MAGNESIUM SULFATE IN WATER 2 G: 40 INJECTION, SOLUTION INTRAVENOUS at 09:15

## 2018-01-01 RX ADMIN — HEPARIN SODIUM 5000 UNITS: 5000 INJECTION, SOLUTION INTRAVENOUS; SUBCUTANEOUS at 05:12

## 2018-01-01 RX ADMIN — MAGNESIUM HYDROXIDE 30 ML: 400 SUSPENSION ORAL at 05:40

## 2018-01-01 RX ADMIN — CEFTRIAXONE SODIUM 1 G: 1 INJECTION, POWDER, FOR SOLUTION INTRAMUSCULAR; INTRAVENOUS at 05:52

## 2018-01-01 RX ADMIN — METRONIDAZOLE 500 MG: 500 TABLET ORAL at 05:55

## 2018-01-01 RX ADMIN — SODIUM CHLORIDE, SODIUM LACTATE, POTASSIUM CHLORIDE, CALCIUM CHLORIDE AND DEXTROSE MONOHYDRATE: 5; 600; 310; 30; 20 INJECTION, SOLUTION INTRAVENOUS at 02:50

## 2018-01-01 RX ADMIN — IOHEXOL 100 ML: 350 INJECTION, SOLUTION INTRAVENOUS at 14:32

## 2018-01-01 RX ADMIN — IOHEXOL 80 ML: 350 INJECTION, SOLUTION INTRAVENOUS at 21:27

## 2018-01-01 RX ADMIN — ACETAMINOPHEN 650 MG: 325 TABLET, FILM COATED ORAL at 05:40

## 2018-01-01 RX ADMIN — SODIUM CHLORIDE 500 ML: 9 INJECTION, SOLUTION INTRAVENOUS at 11:09

## 2018-01-01 RX ADMIN — METRONIDAZOLE 500 MG: 500 TABLET ORAL at 12:50

## 2018-01-01 RX ADMIN — FAMOTIDINE 20 MG: 20 TABLET, FILM COATED ORAL at 05:55

## 2018-01-01 RX ADMIN — CYANOCOBALAMIN 1000 MCG: 1000 INJECTION INTRAMUSCULAR; SUBCUTANEOUS at 03:32

## 2018-01-01 RX ADMIN — DEXTROSE MONOHYDRATE 25 ML: 500 INJECTION PARENTERAL at 00:05

## 2018-01-01 RX ADMIN — FAMOTIDINE 20 MG: 20 TABLET, FILM COATED ORAL at 17:37

## 2018-01-01 RX ADMIN — METRONIDAZOLE 500 MG: 500 INJECTION, SOLUTION INTRAVENOUS at 21:50

## 2018-01-01 RX ADMIN — DEXTROSE MONOHYDRATE 25 ML: 500 INJECTION PARENTERAL at 23:34

## 2018-01-01 RX ADMIN — NOREPINEPHRINE BITARTRATE 10 MCG/MIN: 1 INJECTION INTRAVENOUS at 02:58

## 2018-01-01 RX ADMIN — POLYETHYLENE GLYCOL 3350 1 PACKET: 17 POWDER, FOR SOLUTION ORAL at 06:00

## 2018-01-01 RX ADMIN — SODIUM CHLORIDE: 9 INJECTION, SOLUTION INTRAVENOUS at 22:27

## 2018-01-01 RX ADMIN — CEFTRIAXONE SODIUM 1 G: 1 INJECTION, POWDER, FOR SOLUTION INTRAMUSCULAR; INTRAVENOUS at 05:12

## 2018-01-01 ASSESSMENT — LIFESTYLE VARIABLES
TOTAL SCORE: 0
TOTAL SCORE: 0
HAVE YOU EVER FELT YOU SHOULD CUT DOWN ON YOUR DRINKING: NO
EVER HAD A DRINK FIRST THING IN THE MORNING TO STEADY YOUR NERVES TO GET RID OF A HANGOVER: NO
DO YOU DRINK ALCOHOL: YES
EVER FELT BAD OR GUILTY ABOUT YOUR DRINKING: NO
HAVE PEOPLE ANNOYED YOU BY CRITICIZING YOUR DRINKING: NO
TOTAL SCORE: 0
CONSUMPTION TOTAL: INCOMPLETE

## 2018-01-01 ASSESSMENT — PULMONARY FUNCTION TESTS
FVC: .5
FVC: .55

## 2018-01-03 NOTE — PROGRESS NOTES
Telephoned patient to review breast cancer treatment summary and survivorship care plan.     Pt stated she does not have any questions regarding SCP, denied needs or concerns at this time.     General ASCO breast cancer follow up guidelines reviewed with patient. Patient referred to treating physician for individualized follow up schedule and recommendations / questions.    Scanned to EPIC

## 2018-11-05 PROBLEM — K56.2 SMALL BOWEL VOLVULUS (HCC): Status: ACTIVE | Noted: 2018-01-01

## 2018-11-05 PROBLEM — E87.5 HYPERKALEMIA: Status: ACTIVE | Noted: 2018-01-01

## 2018-11-05 PROBLEM — T81.44XA SEPSIS FOLLOWING INTRA-ABDOMINAL SURGERY (HCC): Status: ACTIVE | Noted: 2018-01-01

## 2018-11-05 PROBLEM — Z78.9 NO CONTRAINDICATION TO DEEP VEIN THROMBOSIS (DVT) PROPHYLAXIS: Status: ACTIVE | Noted: 2018-01-01

## 2018-11-05 NOTE — OP REPORT
Operative Report     Patient:   Leanna Campbell  :    1940  MRN:    [id number]  CSN:    [id number]    Date of Surgery:  2018     Surgeon:   Christopher Yañez M.D.     Assistant:    Zeus CARMONA    Pre-operative Diagnosis:    1) closed-loop small bowel obstruction  2) severe COPD on home oxygen and multiple medications  3) severe protein calorie malnutrition  4) history of previous small bowel obstruction  5) history of cholecystectomy and bile leak  6) history of breast cancer and recurrent breast cancer  7) alcohol dependency  8) tobacco use    Post-operative Diagnosis:   1) closed-loop small bowel obstruction  2) severe COPD on home oxygen and multiple medications  3) severe protein calorie malnutrition  4) history of previous small bowel obstruction  5) history of cholecystectomy and bile leak  6) history of breast cancer and recurrent breast cancer  7) alcohol dependency  8) tobacco use    Procedure:     -Exploratory laparotomy  -Lysis of adhesions, approximately 30 minutes  -Small bowel resection with primary anastomosis  -Placement of negative pressure dressing greater than 50 cm² (therapy)    Anesthesia:   General    Blood Loss:   100 cc    Specimen:   Small bowel sent to pathology with stitch on distal margin    Findings:   Closed-loop proximal jejunal obstruction, combination of volvulus and adhesive disease.  The involved segment was completely necrotic and had a contained perforation which was released upon mobilizing the bowel.  There was evidence of purulent ascites in all 4 quadrants.    Wound classification: Dirty/infected    Disposition:   Intubated, to ICU on pressors      Indications:  78-year-old female with multiple comorbidities and an extensive abdominal surgical history who presented to the ER tonight with diffuse abdominal pain worsening over the past 2 days.  She was found to have closed loop small bowel obstruction on CT and peritonitis on exam.  I recommended emergent  surgical exploration to release the small bowel obstruction and perform bowel resection if needed.  The patient and her 2 daughters were present for the preoperative conversation.  We discussed the recommendation for surgery and the very real possibility that she would not survive without surgery.  We discussed the risks which are extensive and include but are not limited to bleeding, infection, injury to vessels or nerves, injury to intra-abdominal organs, risks of anesthesia, possibility of an ostomy, possibility of wound VAC, possibility of a prolonged intensive care unit stay and a prolonged hospital stay, and global risks such as stroke, heart attack, pulmonary complications which she is high risk for, possible multiorgan failure, and even a very real possibility that she may not survive the operation or the subsequent recovery.  Patient and her daughters understood all this information and agreed to proceed.  Informed consent was obtained.    Procedure in detail:  The patient was identified in the pre-operative holding area and brought to the operating room. Pre-operative antibiotics were administered prior to the procedure. Anesthesia was smoothly induced. The patient was prepped and draped in the usual sterile fashion. A surgical time out was called to identify the correct patient, procedure, and equipment.  Everyone was in agreement.      Procedure began with a midline expiratory laparotomy incision and we carefully entered the abdomen sharply.  There were no significant adhesions between the underlying bowel and the abdominal wall making the entrance into the abdomen fairly straightforward.  We found purulent ascites in all 4 quadrants.  We began to lyse intraloop adhesions between the various loops of small bowel and after about 30 minutes of lysis of adhesions we identified the necrotic segment of small bowel lying deep in the abdomen.  When I attempted to eviscerate this segment of bowel it opened a  contained perforation and purulent fluid and succus spilled out of the area.  We continued to lyse adhesions and mobilize this segment of small bowel until we identified the proximal and distal margins of the involved segment.  We resected these areas with blue THOMAS staplers and took down the intervening mesentery with the impact LigaSure.  We then performed a stapled side-to-side functional end-to-end anastomosis and we reinforced the staple line with a running locked 2-0 Vicryl suture.  We placed a core tract feeding tube down into the second portion of the duodenum and we placed an NG tube down into the stomach.  We then irrigated the abdomen with several liters of warm irrigation and suctioned clean.  We then placed in AB Thera negative pressure dressing and connected it to suction and it was found to have a good seal.    Sponge and needle counts were correct at the end of the case.     Patient required pressors during the operation.  She remained intubated and was sent to the ICU in guarded condition.  Anesthesiology did place a radial art line and a central line prior to leaving the operating room.    Postoperative plan:  Patient will be resuscitated in the ICU and brought back to the operating room for reexploration and closure if able in about 24-48 hours depending on her clinical improvement.  We can start trophic tube feedings through her core tract feeding tube as it is postpyloric however the NG tube should remain on suction for the time being to protect from possible aspiration.    Christopher Yañez MD  General and Vascular Surgery  [facility]  Cell [phone]  Office [phone]

## 2018-11-05 NOTE — ED TRIAGE NOTES
"Chief Complaint   Patient presents with   • Abdominal Pain     intermittent since last wk, became severe this am   • N/V     intermittent x1 wk, worsening this am     Blood pressure (!) 113/38, pulse 92, temperature (!) 35 °C (95 °F), resp. rate 12, height 1.575 m (5' 2\"), weight 43.1 kg (95 lb), SpO2 93 %, not currently breastfeeding.    Pt comes to triage in a wheelchair with daughters, pt is weak, in visible discomfort. Pt c/o chills, abdominal pain in the center of abdomen. Pt has hx of bowl obstruction, chronic constipation.   "

## 2018-11-05 NOTE — CARE PLAN
Problem: Ventilation Defect:  Goal: Ability to achieve and maintain unassisted ventilation or tolerate decreased levels of ventilator support    Intervention: Support and monitor invasive and noninvasive mechanical ventilation  Adult Ventilation Update    Total Vent Days: 1    Patient Lines/Drains/Airways Status    Active Airway     Name: Placement date: Placement time: Site: Days:    Airway ETT Oral 8.0 @ 20 11/05/18      Oral   1              APV: 22, 330, +8, 50%  No changes made, will continue to monitor.

## 2018-11-05 NOTE — ASSESSMENT & PLAN NOTE
Acute hyperkalemia in the postoperative setting.  Change intravenous fluids, calcium gluconate administration, glucose management.

## 2018-11-05 NOTE — ASSESSMENT & PLAN NOTE
Acute volvulus of the proximal jejunum with closed loop obstruction and necrotic intervening bowel.  11/5 Exploratory laparotomy with bowel resection and primary anastomosis.  Damage control closure for enteric contamination.  11/5 Ceftriaxone and metronidazole day #1 following definitive source control.  Plan second look laparotomy with ABThera dressing change versus delayed primary fascial closure.  Christopher Yañez MD.  General and Vascular Surgery.

## 2018-11-05 NOTE — ED PROVIDER NOTES
"ED Provider Note    CHIEF COMPLAINT  Chief Complaint   Patient presents with   • Abdominal Pain     intermittent since last wk, became severe this am   • N/V     intermittent x1 wk, worsening this am       HPI  Leanna Campbell is a 78 y.o. female here for evaluation of abdominal pain and vomiting.  The patient states that she has had some vomiting episodes over the last couple of days, with increasing abdominal pain.  She states that the abdominal pain is \"all over.\"  She states that she is intermittently had it over the last week, but it has been increasing since.  She does have history of small bowel obstruction.  The patient has no chest pain, and no shortness of breath.  She has not taken anything prior to coming in to alleviate her pain.  Nothing alleviates or exacerbates her symptoms.  She states that the pain is all of her abdomen, and does not radiate to her back.  She describes as a sharp and stabbing pain that is been intermittent.    PAST MEDICAL HISTORY   has a past medical history of Arrhythmia; Arthritis; Back pain; Bowel obstruction ([facility]); Breast cancer ([facility]); Breath shortness; Bronchitis; Cancer (HCC) (1998); Carcinoma (HCC) (2002 and 2013); COPD; Dental disorder; GERD (gastroesophageal reflux disease); Heart burn; Hiatus hernia syndrome; Indigestion; On home oxygen therapy; Osteoporosis; Other specified disorder of intestines; Pneumonia (2012); Sleep apnea; Supplemental oxygen dependent; Urinary bladder disorder (11/29/10); and Urinary incontinence.    SOCIAL HISTORY  Social History     Social History Main Topics   • Smoking status: Current Every Day Smoker     Packs/day: 0.50     Years: 50.00     Types: Cigarettes   • Smokeless tobacco: Former User     Quit date: 2/1/2013   • Alcohol use Yes      Comment: at least 3 per day   • Drug use: No   • Sexual activity: Not on file       Family History  Disorders    SURGICAL HISTORY   has a past surgical history that includes ezra by laparoscopy " (9/2010); hernia repair (2005); laparoscopy (9/24/2010); ercp in or (9/26/2010); exploratory laparotomy (9/26/2010); common bile duct exploration (9/26/2010); bowel resection (2005); hysterectomy, total abdominal (1970's); hip cannulated screw (2/2/2013); ercp w/rem fb and/or stent chg (12/10/2010); ercp w/removal calculus (12/10/2010); remv 2nd cataract,corn-scler sectn; hemorrhoidectomy; hysterectomy laparoscopy; mastectomy (Bilateral, 8/1/2017); and node biopsy sentinel (Right, 8/1/2017).    CURRENT MEDICATIONS  Home Medications     Reviewed by Dragan Cameron (Pharmacy Tech) on 11/04/18 at 2209  Med List Status: Complete   Medication Last Dose Status   anastrozole (ARIMIDEX) 1 MG Tab 11/4/2018 Active   guaifenesin LA (MUCINEX) 600 MG TABLET SR 12 HR 11/4/2018 Active   ipratropium-albuterol (COMBIVENT RESPIMAT)  MCG/ACT Aero Soln 11/4/2018 Active   ipratropium-albuterol (DUONEB) 0.5-2.5 (3) MG/3ML nebulizer solution 11/4/2018 Active   polyethylene glycol 3350 (MIRALAX) Powder 11/4/2018 Active   SYMBICORT 160-4.5 MCG/ACT Aerosol 11/4/2018 Active   tiotropium (SPIRIVA HANDIHALER) 18 MCG Cap 11/4/2018 Active                ALLERGIES  Allergies   Allergen Reactions   • Codeine Nausea   • Darvon [Propoxyphene Hcl]    • Pcn [Penicillins] Rash   • Ciprofloxacin Rash and Itching     Pt complained of itch on hands, mouth and was red in the face after 2100 dose        REVIEW OF SYSTEMS  See HPI for further details. Review of systems as above, otherwise all other systems are negative.     PHYSICAL EXAM  Constitutional: Elderly, cachectic  HEENT: Normocephalic, atraumatic.  Dry mucous membranes  Eyes:  EOMI. Normal sclera.  Neck: Supple, Full range of motion, nontender.  Chest/Pulmonary: clear to ausculation. Symmetrical expansion.   Cardio: Regular rate and rhythm with no murmur.   Abdomen: Soft, diffuse tenderness to palpation, no peritoneal signs. No guarding. No palpable masses.  Musculoskeletal: No deformity,  no edema, neurovascular intact.   Neuro: Clear speech, appropriate, cooperative, cranial nerves II-XII grossly intact.   Psych: Anxious    CT-ABDOMEN-PELVIS WITH   Final Result      1.  Mechanical small bowel obstruction with a closed loop. No pneumatosis or pneumoperitoneum is appreciated. Small amount of ascites is suggestive of developing ischemia.      2.  Atherosclerosis.         Comment: Results discussed with Dr. Mckeon at 9:50 PM        Results for orders placed or performed during the hospital encounter of 11/04/18   CBC WITH DIFFERENTIAL   Result Value Ref Range    WBC 11.1 (H) 4.8 - 10.8 K/uL    RBC 5.43 (H) 4.20 - 5.40 M/uL    Hemoglobin 16.6 (H) 12.0 - 16.0 g/dL    Hematocrit 51.8 (H) 37.0 - 47.0 %    MCV 95.4 81.4 - 97.8 fL    MCH 30.6 27.0 - 33.0 pg    MCHC 32.0 (L) 33.6 - 35.0 g/dL    RDW 48.2 35.9 - 50.0 fL    Platelet Count 327 164 - 446 K/uL    MPV 9.5 9.0 - 12.9 fL    Neutrophils-Polys 84.40 (H) 44.00 - 72.00 %    Lymphocytes 11.30 (L) 22.00 - 41.00 %    Monocytes 2.30 0.00 - 13.40 %    Eosinophils 0.80 0.00 - 6.90 %    Basophils 0.80 0.00 - 1.80 %    Immature Granulocytes 0.40 0.00 - 0.90 %    Nucleated RBC 0.00 /100 WBC    Neutrophils (Absolute) 9.40 (H) 2.00 - 7.15 K/uL    Lymphs (Absolute) 1.26 1.00 - 4.80 K/uL    Monos (Absolute) 0.26 0.00 - 0.85 K/uL    Eos (Absolute) 0.09 0.00 - 0.51 K/uL    Baso (Absolute) 0.09 0.00 - 0.12 K/uL    Immature Granulocytes (abs) 0.04 0.00 - 0.11 K/uL    NRBC (Absolute) 0.00 K/uL   COMP METABOLIC PANEL   Result Value Ref Range    Sodium 139 135 - 145 mmol/L    Potassium 4.6 3.6 - 5.5 mmol/L    Chloride 98 96 - 112 mmol/L    Co2 25 20 - 33 mmol/L    Anion Gap 16.0 (H) 0.0 - 11.9    Glucose 186 (H) 65 - 99 mg/dL    Bun 14 8 - 22 mg/dL    Creatinine 0.98 0.50 - 1.40 mg/dL    Calcium 11.7 (H) 8.5 - 10.5 mg/dL    AST(SGOT) 21 12 - 45 U/L    ALT(SGPT) 10 2 - 50 U/L    Alkaline Phosphatase 55 30 - 99 U/L    Total Bilirubin 0.7 0.1 - 1.5 mg/dL    Albumin 5.1 (H)  3.2 - 4.9 g/dL    Total Protein 8.0 6.0 - 8.2 g/dL    Globulin 2.9 1.9 - 3.5 g/dL    A-G Ratio 1.8 g/dL   LIPASE   Result Value Ref Range    Lipase 26 11 - 82 U/L   TROPONIN   Result Value Ref Range    Troponin I <0.01 0.00 - 0.04 ng/mL   BTYPE NATRIURETIC PEPTIDE   Result Value Ref Range    B Natriuretic Peptide 61 0 - 100 pg/mL   LACTIC ACID   Result Value Ref Range    Lactic Acid 3.4 (H) 0.5 - 2.0 mmol/L   ESTIMATED GFR   Result Value Ref Range    GFR If African American >60 >60 mL/min/1.73 m 2    GFR If Non African American 55 (A) >60 mL/min/1.73 m 2       PROCEDURES     MEDICAL RECORD  I have reviewed patient's medical record and pertinent results are listed above.    COURSE & MEDICAL DECISION MAKING  I have reviewed any medical record information, laboratory studies and radiographic results as noted above.    CRITICAL CARE  The very real possibility of a deterioration of this patient's condition required the highest level of my preparedness for sudden, emergent intervention.  I provided critical care services, which included medication orders, frequent reevaluations of the patient's condition and response to treatment, ordering and reviewing test results, and discussing the case with various consultants.  The critical care time associated with the care of the patient was 35 minutes. Review chart for interventions. This time is exclusive of any other billable procedures.     The pt her gallbladder removed by Armen, and her last sbo handled by jerald.      9:45 PM;  Pt with continued pain, and will need additional pain medications.     9:57 PM:  Pt has not had any additional vomiting.  Daughters still present.     10:10 PM  Pt will remain NPO    10:19 PM  Dr. Yañez will come and see the pt in the er.     10:31 PM  Dr. Yañez is here to see the pt. he would like to take the patient to surgery.    11:03 PM  Dr. Sanches will admit the pt.  He will see her upstairs after surgery.     The pt was given iv fluids  secondary to her vomiting and lactic acid.     The pt feels better after her iv fluids.     FINAL IMPRESSION  Closed loop sbo  Critical care 35 minutes.       Electronically signed by: Bandar Mckeon, 11/4/2018 10:19 PM

## 2018-11-05 NOTE — NON-PROVIDER
Patient Summary: Ms. Campbell is a 78 year old female who had an exploratory laparotomy on 11/4 for a proximal small bowel obstruction with volvulus. There was open perforation and bowel contamination and an Abthera dressing was placed. Patient required significant pressers intraoperatively and was admitted to the ICU for postoperative resuscitation and stabilization.    24 Hour Events:  The patient has been off vasoactive medications since 0400 this morning due to improved blood pressure while in the unit. She is currently on the ventilator and is opening her eyes to verbal stimuli, as well as moves her extremities to pain. She has an Abthera in place that has been getting adequate output of 1000 cc overnight. She has a casey in place and has had a decreased urine output of 10cc/hr over the last hour. She has been afebrile.    SUBJECTIVE/OBJECTIVE:  NEURO:  GCS  24hr: 15 on arrival to the ED   Current: 8/9T   Exam Moving all extremities to pain, alert, pupils equal and reactive to light   Meds/Pain Propofol IV @ 5.2 ml/hr, Fentanyl IV @ 25 mcg/hr, Fentanyl IM 25 mcg q3hr prn, Toradol IV 15mg q6hr,     Labs None   Imaging None     RESP:  RR, SpO2 (22-22); 22, (%);100% on ventilator   Vent PEEP 8, FiO2 100%, RR 22   Exam Breath sound bilaterally   Meds Symbicort 160-4.5 MCG/ACT 2 puffs BID (held), DuoNeb 3 ml qhr prn, and Spiriva 18 mcg 1 inhalation daily   Labs CO2 22, Anion Gap 8   Imaging CXR 11/5 showed pulmonary expansion, consistent with COPD and left subclavian catheter tip project over the superior vena cava. No definite pneumothorax is identified.     CV:  HR/BP/MAP/  CVP HR (78-93); 92/BP (/30-91); 103/71   Exam Regular rate and rhythm, no murmurs, rubs, or gallops   Meds Hydrazaline 10 mg qhr prn if SBP > 160, Labetalol 10 mg IV if SBP > 160, Levophed 8 mg in  ml at rate of 46.9 mlk/hr (discontinued at 0400), Vasopressin 20 units in  ml at rate of 9 ml/hr (discontinued at 0400)   Labs  RBC 5.43, H&H 16.6/51.8, MCV 95.4, Platelets 327, Troponin <0.01   Imaging None     GI:  Diet/Bowel Function Enteral Tube Feeding/No bowel movement today   Exam Abthera in place in central abdomen, no signs of erythema or purulent drainage, output from Abthera 1000 cc overnight   Labs CBC 11.1, K+ 6.3, Glucose 240, AST 33, ALT 9, Albumin 3.1, Total protein 4.9, Globulin 1.8, Lactic Acid 2.4   Imaging CT-A/P 11/4 showed a mechanical small bowel obstruction with a closed loop. No pneumatosis or pneumoperitoneum is appreciated. Small amount of ascites is suggestive of developing ischemia.     F/E/N-:  I/O  24hr totals: 450 cc   Intake: D5LR at 100 cc/hr   Output: 10 cc/hr in the last hour                                         Exam Chambers catheter in place   Meds D5LR at 100 cc/hr (held given elevated K+)   Labs Na+ 138, K+ 6.3, Cl 108, BUN 16, Cr 0.81   Nutrition Tube Feeds through Core tract (slowly start as tolerated)     HEME:  Labs RBC 5.43, H&H 16.6/51.8, MCV 95.4, Platelets 327   Transfusions None   Imaging None     ID:  Temp  24hr: 35-35.8 (95-96.5)   Tmax: 96.5   Labs WBC 11.1, Neutrophils-poly 87.2, Absolute Neutrophils 10.2   Micro Preliminary blood cultures negative   ABX Rocephin 1 g IV daily, Flagyl 500 mg IV q8hr     ENDOCRINE:  Blood Sugar 240    Meds Humulin 1-6 units q6h     MUSCULOSKELETAL:  Imaging None   WB Status On ventilator, unable to ambulate     SKIN:  Exam Cool to the touch, dry, several areas of ecchymosis,    Interventions Check for pressure wound q shift     ASSESSMENT/PLAN:  NEURO: Patient is alert with GCS of 8/9T. She is moving all extremities and pupils are equal and reactive to light. Continue neuro checks every shift. Patient will a history of alcohol abuse and will monitor for alcohol withdrawal.    RESP: Patient has a history of COPD. She is currently on the ventilator and stable. She will remain intubated and on the ventilator until she goes back to surgery in 24-48 hours for  closure of abdominal incision. Can restart home medications of Spiriva, DuoNeb, Symbicort, and Combivent once extubated.     CV: Blood pressure has been stable since admission to the unit (SBP 90's-110's). Stop Vasopressin and Norepi given her stable blood pressure. If SBP gets above 160 can give patient Labetalol.     GI: Patient had bowel resection and primary anastomosis of proximal small bowel obstruction with volvulus by Dr. Yañez on 11/4/19.  Abthera in place from Ex. Lap site with adequate drainage of 1000 cc overnight. There appears to be no signs of infection from the wound site as there is no surrounding erythema or drainage. Continue to check dressing for signs of infection. If patient continues to be stable from a hemodynamic standpoint, Dr. Yañez will take her back to the OR in 24-48 hours for re-exploration and closure of abdominal incision.     FEN-: Labs reveal that patient is protein deficient. Given that she is stable, it is adequate to gradually start tube feeds this afternoon through her core tract as tolerated. Her K+ is elevated at 6.3 so D5LR has been held. Her urine output has slightly decreased to 10 cc/hr over the last hour. Change her IV fluids to  cc/hr and administer 1 L NS bolus. Start calcium gluconate and glucose management for her hyperkalemia. Repeat BMP in in 4 hours to check potassium level.    HEME: Patient is hemodynamically stable at this time. There is no evidence of active intra-abdominal bleed. Her H&H is stable. Continue to monitor with daily CBC for evidence of intra-abdominal bleeds.    ID: Patient has been afebrile with a mildly elevated WBC count. Follow sepsis bundle. Day 1 of antibiotics follow source control. Continue IV Rocephin 1 g daily and IV Flagyl 500 mg IV q8hr. Repeat CBC and lactate q6 hours.     ENDOCRINE: Patient has had elevated blood sugar of 240. Consider medicating with Humulin.     MUSCULOSKELETAL: Patient can move to edge of bed with  assistance if tolerated. Continue DVT prophylaxis of Lovenox 40 mg IM daily.     SKIN: Continue daily skin checks to assess for pressure wounds. Place heating blanket on patient given that she is cool to the touch and has had Max Temp of 96.5.    PROPHYLAXIS:  DVT  Lovenox 40 mg IM daily   GI  Dulocolax suppository 10 mg daily, Colace 100 mg po BID, Pepcid 20 mg IV BID, Milk of Magnesia 30 mL po daily, Miralax packet BID   Restraints  Present in bilateral upper extremities     LINES/TUBES/CATHETERS:  Radial Art line, Central line  Chambres catheter

## 2018-11-05 NOTE — ASSESSMENT & PLAN NOTE
Resolving septic shock secondary to small bowel volvulus and enteric contamination.  11/5 exploratory laparotomy with bowel resection and damage control closure.  Sepsis bundle implemented. Trending endpoints of resuscitation.  11/8 Antibiotic day 4.

## 2018-11-05 NOTE — PROGRESS NOTES
Trauma / Surgical Daily Progress Note    Date of Service  11/5/2018    Chief Complaint  78 y.o. female admitted 11/4/2018 with Bowel obstruction (HCC)    Interval Events  New admission to the trauma ICU.  Exploratory laparotomy for acute small bowel volvulus.  Small bowel resection and damage control closure.  Sepsis resuscitation.  Broad-spectrum antibiotic therapy.  Ventilator management.  Acute hyperkalemia.    Review of Systems  Review of Systems   Unable to perform ROS: Acuity of condition        Vital Signs for last 24 hours  Temp:  [35 °C (95 °F)-35.8 °C (96.5 °F)] 35.8 °C (96.5 °F)  Pulse:  [78-93] 91  Resp:  [12-26] 22  BP: (113)/(38) 113/38    Hemodynamic parameters for last 24 hours       Respiratory Data     Respiration: (!) 22, Pulse Oximetry: 100 %     Work Of Breathing / Effort: Vented  RUL Breath Sounds: Diminished, RML Breath Sounds: Diminished, RLL Breath Sounds: Diminished, ROSA Breath Sounds: Diminished, LLL Breath Sounds: Diminished    Physical Exam  Physical Exam   Constitutional: She appears cachectic. She appears ill. She is sedated and intubated.   HENT:   Head: Normocephalic and atraumatic.   Eyes: Pupils are equal, round, and reactive to light. Conjunctivae are normal.   Neck: Neck supple. No tracheal deviation present.   Cardiovascular: Normal rate, regular rhythm and intact distal pulses.    Pulmonary/Chest: She is intubated. No respiratory distress.   Hyperresonant breath sounds bilaterally  Stigmata of chronic COPD   Abdominal: Soft. There is tenderness.   AbThera dressing intact   Genitourinary:   Genitourinary Comments: Mildly concentrated clear yellow urine   Musculoskeletal: Normal range of motion. She exhibits no edema or deformity.   Neurological: She exhibits normal muscle tone. Coordination normal.   Psychiatric:   Unable to assess currently   Nursing note and vitals reviewed.      Laboratory  Recent Results (from the past 24 hour(s))   CBC WITH DIFFERENTIAL    Collection Time:  11/04/18  8:26 PM   Result Value Ref Range    WBC 11.1 (H) 4.8 - 10.8 K/uL    RBC 5.43 (H) 4.20 - 5.40 M/uL    Hemoglobin 16.6 (H) 12.0 - 16.0 g/dL    Hematocrit 51.8 (H) 37.0 - 47.0 %    MCV 95.4 81.4 - 97.8 fL    MCH 30.6 27.0 - 33.0 pg    MCHC 32.0 (L) 33.6 - 35.0 g/dL    RDW 48.2 35.9 - 50.0 fL    Platelet Count 327 164 - 446 K/uL    MPV 9.5 9.0 - 12.9 fL    Neutrophils-Polys 84.40 (H) 44.00 - 72.00 %    Lymphocytes 11.30 (L) 22.00 - 41.00 %    Monocytes 2.30 0.00 - 13.40 %    Eosinophils 0.80 0.00 - 6.90 %    Basophils 0.80 0.00 - 1.80 %    Immature Granulocytes 0.40 0.00 - 0.90 %    Nucleated RBC 0.00 /100 WBC    Neutrophils (Absolute) 9.40 (H) 2.00 - 7.15 K/uL    Lymphs (Absolute) 1.26 1.00 - 4.80 K/uL    Monos (Absolute) 0.26 0.00 - 0.85 K/uL    Eos (Absolute) 0.09 0.00 - 0.51 K/uL    Baso (Absolute) 0.09 0.00 - 0.12 K/uL    Immature Granulocytes (abs) 0.04 0.00 - 0.11 K/uL    NRBC (Absolute) 0.00 K/uL   COMP METABOLIC PANEL    Collection Time: 11/04/18  8:26 PM   Result Value Ref Range    Sodium 139 135 - 145 mmol/L    Potassium 4.6 3.6 - 5.5 mmol/L    Chloride 98 96 - 112 mmol/L    Co2 25 20 - 33 mmol/L    Anion Gap 16.0 (H) 0.0 - 11.9    Glucose 186 (H) 65 - 99 mg/dL    Bun 14 8 - 22 mg/dL    Creatinine 0.98 0.50 - 1.40 mg/dL    Calcium 11.7 (H) 8.5 - 10.5 mg/dL    AST(SGOT) 21 12 - 45 U/L    ALT(SGPT) 10 2 - 50 U/L    Alkaline Phosphatase 55 30 - 99 U/L    Total Bilirubin 0.7 0.1 - 1.5 mg/dL    Albumin 5.1 (H) 3.2 - 4.9 g/dL    Total Protein 8.0 6.0 - 8.2 g/dL    Globulin 2.9 1.9 - 3.5 g/dL    A-G Ratio 1.8 g/dL   LIPASE    Collection Time: 11/04/18  8:26 PM   Result Value Ref Range    Lipase 26 11 - 82 U/L   TROPONIN    Collection Time: 11/04/18  8:26 PM   Result Value Ref Range    Troponin I <0.01 0.00 - 0.04 ng/mL   BTYPE NATRIURETIC PEPTIDE    Collection Time: 11/04/18  8:26 PM   Result Value Ref Range    B Natriuretic Peptide 61 0 - 100 pg/mL   LACTIC ACID    Collection Time: 11/04/18  8:26 PM    Result Value Ref Range    Lactic Acid 3.4 (H) 0.5 - 2.0 mmol/L   ESTIMATED GFR    Collection Time: 11/04/18  8:26 PM   Result Value Ref Range    GFR If African American >60 >60 mL/min/1.73 m 2    GFR If Non African American 55 (A) >60 mL/min/1.73 m 2   BLOOD CULTURE x2    Collection Time: 11/04/18  8:26 PM   Result Value Ref Range    Significant Indicator NEG     Source BLD     Site PERIPHERAL     Blood Culture       No Growth    Note: Blood cultures are incubated for 5 days and  are monitored continuously.Positive blood cultures  are called to the RN and reported as soon as  they are identified.     BLOOD CULTURE x2    Collection Time: 11/04/18  9:41 PM   Result Value Ref Range    Significant Indicator NEG     Source BLD     Site PERIPHERAL     Blood Culture       No Growth    Note: Blood cultures are incubated for 5 days and  are monitored continuously.Positive blood cultures  are called to the RN and reported as soon as  they are identified.     CBC WITH DIFFERENTIAL    Collection Time: 11/05/18  4:45 AM   Result Value Ref Range    WBC 11.7 (H) 4.8 - 10.8 K/uL    RBC 3.86 (L) 4.20 - 5.40 M/uL    Hemoglobin 11.7 (L) 12.0 - 16.0 g/dL    Hematocrit 36.5 (L) 37.0 - 47.0 %    MCV 94.6 81.4 - 97.8 fL    MCH 30.3 27.0 - 33.0 pg    MCHC 32.1 (L) 33.6 - 35.0 g/dL    RDW 48.1 35.9 - 50.0 fL    Platelet Count 271 164 - 446 K/uL    MPV 10.3 9.0 - 12.9 fL    Neutrophils-Polys 87.20 (H) 44.00 - 72.00 %    Lymphocytes 5.90 (L) 22.00 - 41.00 %    Monocytes 5.90 0.00 - 13.40 %    Eosinophils 0.40 0.00 - 6.90 %    Basophils 0.30 0.00 - 1.80 %    Immature Granulocytes 0.30 0.00 - 0.90 %    Nucleated RBC 0.00 /100 WBC    Neutrophils (Absolute) 10.20 (H) 2.00 - 7.15 K/uL    Lymphs (Absolute) 0.69 (L) 1.00 - 4.80 K/uL    Monos (Absolute) 0.69 0.00 - 0.85 K/uL    Eos (Absolute) 0.05 0.00 - 0.51 K/uL    Baso (Absolute) 0.03 0.00 - 0.12 K/uL    Immature Granulocytes (abs) 0.04 0.00 - 0.11 K/uL    NRBC (Absolute) 0.00 K/uL   COMP  METABOLIC PANEL    Collection Time: 11/05/18  4:45 AM   Result Value Ref Range    Sodium 138 135 - 145 mmol/L    Potassium 6.3 (H) 3.6 - 5.5 mmol/L    Chloride 108 96 - 112 mmol/L    Co2 22 20 - 33 mmol/L    Anion Gap 8.0 0.0 - 11.9    Glucose 240 (H) 65 - 99 mg/dL    Bun 16 8 - 22 mg/dL    Creatinine 0.81 0.50 - 1.40 mg/dL    Calcium 8.5 8.5 - 10.5 mg/dL    AST(SGOT) 33 12 - 45 U/L    ALT(SGPT) 9 2 - 50 U/L    Alkaline Phosphatase 25 (L) 30 - 99 U/L    Total Bilirubin 0.5 0.1 - 1.5 mg/dL    Albumin 3.1 (L) 3.2 - 4.9 g/dL    Total Protein 4.9 (L) 6.0 - 8.2 g/dL    Globulin 1.8 (L) 1.9 - 3.5 g/dL    A-G Ratio 1.7 g/dL   LACTIC ACID    Collection Time: 11/05/18  4:45 AM   Result Value Ref Range    Lactic Acid 2.4 (H) 0.5 - 2.0 mmol/L   MAGNESIUM    Collection Time: 11/05/18  4:45 AM   Result Value Ref Range    Magnesium 1.8 1.5 - 2.5 mg/dL   PHOSPHORUS    Collection Time: 11/05/18  4:45 AM   Result Value Ref Range    Phosphorus 4.5 2.5 - 4.5 mg/dL   Triglycerides Starting now and then Every 3 Days    Collection Time: 11/05/18  4:45 AM   Result Value Ref Range    Triglycerides 77 0 - 149 mg/dL   CRP QUANTITIVE (NON-CARDIAC)    Collection Time: 11/05/18  4:45 AM   Result Value Ref Range    Stat C-Reactive Protein 0.34 0.00 - 0.75 mg/dL   PREALBUMIN    Collection Time: 11/05/18  4:45 AM   Result Value Ref Range    Pre-Albumin 14.0 (L) 18.0 - 38.0 mg/dL   ESTIMATED GFR    Collection Time: 11/05/18  4:45 AM   Result Value Ref Range    GFR If African American >60 >60 mL/min/1.73 m 2    GFR If Non African American >60 >60 mL/min/1.73 m 2       Fluids    Intake/Output Summary (Last 24 hours) at 11/05/18 0812  Last data filed at 11/05/18 0600   Gross per 24 hour   Intake          3329.75 ml   Output              650 ml   Net          2679.75 ml       Core Measures & Quality Metrics  Labs reviewed, Medications reviewed and Radiology images reviewed  Chambers catheter: Critically Ill - Requiring Accurate Measurement of Urinary  Output  Central line in place: Need for access, Sepsis and Vasopressors    DVT Prophylaxis: Enoxaparin (Lovenox)  DVT prophylaxis - mechanical: SCDs  Ulcer prophylaxis: Yes  Antibiotics: Treating active infection/contamination beyond 24 hours perioperative coverage      TIM Score  ETOH Screening    Assessment/Plan  Sepsis following intra-abdominal surgery   Assessment & Plan    Resolving septic shock secondary to small bowel volvulus and enteric contamination.  11/5 exploratory laparotomy with bowel resection and damage control closure.  Sepsis bundle implemented.  Weaning vasoactive medications.     Small bowel volvulus (HCC)   Assessment & Plan    Acute volvulus of the proximal jejunum with closed loop obstruction and necrotic intervening bowel.  11/5 Exploratory laparotomy with bowel resection and primary anastomosis.  Damage control closure for enteric contamination.  11/5 Ceftriaxone and metronidazole day #1 following definitive source control.  Interval second look laparotomy with AbThera dressing change versus delayed primary fascial closure.  Christopher Yañez MD.  General and Vascular Surgery.     Hyperkalemia   Assessment & Plan    Acute hyperkalemia in the postoperative setting.  Change intravenous fluids, calcium gluconate administration, glucose management.     Acute respiratory failure following trauma and surgery ([facility])- (present on admission)   Assessment & Plan    Ventilatory support following emergent abdominal surgery.  Continue full mechanical ventilatory support. Ventilator bundle and Trauma weaning protocol.     Alcohol abuse- (present on admission)   Assessment & Plan    Habitual daily alcohol use.  Alcohol withdrawal surveillance.     COPD (chronic obstructive pulmonary disease) ([facility])- (present on admission)   Assessment & Plan    Oxygen dependent COPD.    Chronic condition treated with Spiriva, DuoNeb, Symbicort, and Combivent.  Resume maintenance medications following extubation..          Discussed patient condition with family members RN, RT, Pharmacy, Dietary,  and general surgery.  The patient is critically ill with sequelae of acute small bowel volvulus, enteric contamination, and contribution from multiple previously existing comorbidities.  High risk for acute and sudden deterioration and worsening vital organ function.   I provided the following critical care services: Ventilator management, sepsis resuscitation, broad-spectrum antibiotic therapy, and management of acute hyperkalemia.    CRITICAL CARE TIME EXCLUDING PROCEDURES: 40 minutes

## 2018-11-05 NOTE — CARE PLAN
Problem: Respiratory:  Goal: Respiratory status will improve    Intervention: Assess and monitor pulmonary status  RN works with treatment team to ensure Pt plan of care is consistent.  RN and RT in agreement.  Pt being monitored and assessed at appropriate times.      Problem: Infection  Goal: Will remain free from infection    Intervention: Assess signs and symptoms of infection  RN monitors Pt VS and lab values to observe for S/S of infection.  Hand hygiene implemented before and after Pt care.

## 2018-11-05 NOTE — PROGRESS NOTES
Surgery    Closed-loop SBO, severe bowel distention, ascites concerning for ischemia, low BMI concerning for possible malignancy.    OR now for ex lap    Christopher Yañez MD  [facility] (General and Vascular Surgery)  Cell: [phone] (text or call is fine, if you don't reach me please try my office)  Office: [phone]  __________________________________________________________________  Patient:Leanna Larkin Adrian   MRN:[id number]   CSN:[id number]    11/4/2018    10:14 PM

## 2018-11-05 NOTE — PROGRESS NOTES
0123 - Courtney OR RN called to give report    0149 - MD Otilio at bedside calling Pt family to updated on Pt status    0152 - Pt arrived to S-124.  Tranfrred to ICU bed and monitor and vent.    2 RN skin assessment complete:  Pt ABD midline open to Abthera.  Pt extremely thin, skin preventative measures in place for bony prominences.      0324 - MD Colt at bedside     0341 - MD Otilio at bedside to insert casey after multiple attempts by RNs

## 2018-11-05 NOTE — ASSESSMENT & PLAN NOTE
Ventilatory support following emergent abdominal surgery.  Continue full mechanical ventilatory support. Ventilator bundle and Trauma weaning protocol.  Oxygen dependent  COPD as a baseline.

## 2018-11-05 NOTE — ASSESSMENT & PLAN NOTE
Oxygen dependent COPD.    Chronic condition treated with Spiriva, DuoNeb, Symbicort, and Combivent.  Resume maintenance medications following extubation.

## 2018-11-05 NOTE — ED NOTES
Pt requesting additional pain meds, reports pain is worsening again. ERP notified, 50 mcg fentanyl ordered and given.

## 2018-11-05 NOTE — CARE PLAN
Problem: Safety - Medical Restraint  Goal: Remains free of injury from restraints (Restraint for Interference with Medical Device)  INTERVENTIONS:  1. Determine that other, less restrictive measures have been tried or would not be effective before applying the restraint  2. Evaluate the patient's condition at the time of restraint application  3. Inform patient/family regarding the reason for restraint  4. Q2H: Monitor safety, psychosocial status, comfort, nutrition and hydration     Outcome: PROGRESSING AS EXPECTED  Close monitoring in place, pt assessed Q2h, no injury at this time    Problem: Skin Integrity  Goal: Risk for impaired skin integrity will decrease  Outcome: PROGRESSING AS EXPECTED  All pressure points assessed and padded, mepilex in place, no skin breakdown at this time

## 2018-11-05 NOTE — CONSULTS
General Surgery Consult    Date of Service: 11/4/2018    Consulting Physician: Christopher Yañez M.D. [facility]    -------------------------------------------------------------------------------------------------    Chief complaint: abdominal pain    HPI: This is a 78 y.o. female who is presenting with 2 days worsening diffuse abdominal pain and also nausea and vomiting.  CT scan shows a closed loop small bowel obstruction.  She is currently alert and conversant, able to provide history.  She has moderate abdominal pain diffusely.  Otherwise no current complaints.  She is noted to be extremely thin with a BMI of only 17.  She says she has very little appetite at baseline.    Her abdominal surgical history includes:  She underwent an exploration with lysis of adhesions for bowel obstruction in April 2005 by Dr. Chakraborty.  She underwent repair of an incisional hernia in July 2006 by Dr. Chakraborty.  She underwent laparoscopic cholecystectomy on September 21, 2010  She developed a bile leak and underwent laparoscopic washout of a biloma on September 23, 2010  She underwent ERCP by Dr. Fung to place a biliary stent  She underwent exploratory laparotomy with intent to repair a bile leak on September 26, 2010 but no bile leak was identified and a cholangiogram was performed and still no bile leak was identified  She underwent ERCP with removal of biliary stent on 12/10/2010    She also has had a history of breast cancer and also a recurrent breast cancer.  She underwent a bilateral total mastectomy on August 1, 2017 by Dr. Quintana.      Review of systems:  Noncontributory except as per HPI    Past medical history:  COPD on oxygen at home  Alcohol dependence  Breast cancer and recurrent breast cancer  Bowel obstruction  Skin cancer  GERD      Past surgical history:  Hysterectomy in the 1970s  She underwent an exploration with lysis of adhesions for bowel obstruction in April 2005 by Dr. Chakraborty.  She underwent repair of  "an incisional hernia in July 2006 by Dr. Chakraborty.  She underwent laparoscopic cholecystectomy on September 21, 2010  She developed a bile leak and underwent laparoscopic washout of a biloma on September 23, 2010  She underwent ERCP by Dr. Fung to place a biliary stent  She underwent exploratory laparotomy with intent to repair a bile leak on September 26, 2010 but no bile leak was identified and a cholangiogram was performed and still no bile leak was identified  She underwent ERCP with removal of biliary stent on 12/10/2010    She also has had a history of breast cancer and also a recurrent breast cancer.  She underwent a bilateral total mastectomy on August 1, 2017 by Dr. Quintana.    Medications:  Arimidex  Combivent  DuoNeb  Symbicort  Spiriva  Mucinex    Allergies:  Codeine, Darvon, penicillin which causes a rash, ciprofloxacin which causes a rash    Social history:  Smokes half a pack a day for 50 years  Drinks alcohol daily, reports 3 drinks per day    Family history:  Cancer of unknown type    Physical Exam:  Blood pressure (!) 113/38, pulse 84, temperature (!) 35 °C (95 °F), resp. rate (!) 22, height 1.575 m (5' 2\"), weight 43.1 kg (95 lb), SpO2 94 %, not currently breastfeeding.    Constitutional: Alert, oriented, mild distress from abdominal pain     Severe muscle wasting, very cachectic, BMI 17  HEENT:  Normocephalic and atraumatic, EOMI  Neck:   Supple, no JVD,   Cardiovascular: Regular rate and rhythm,   Pulmonary:  Good air entry bilaterally,   Abdominal:  Soft, moderately distended     Tender to palpation diffusely     Rebound tenderness around the umbilicus     Healed midline exploratory laparotomy incision  Musculoskeletal: No edema, no tenderness  Neurological:  CN II-XII grossly intact, no focal deficits  Skin:   Skin is warm and dry. No rash noted.  Psychiatric:  Normal mood and affect.    Labs:  Recent Labs      11/04/18 2026   WBC  11.1*   RBC  5.43*   HEMOGLOBIN  16.6*   HEMATOCRIT  51.8*   MCV  " 95.4   MCH  30.6   MCHC  32.0*   RDW  48.2   PLATELETCT  327   MPV  9.5     Recent Labs      11/04/18 2026   SODIUM  139   POTASSIUM  4.6   CHLORIDE  98   CO2  25   GLUCOSE  186*   BUN  14   CREATININE  0.98   CALCIUM  11.7*         Recent Labs      11/04/18 2026   ASTSGOT  21   ALTSGPT  10   TBILIRUBIN  0.7   ALKPHOSPHAT  55   GLOBULIN  2.9       Radiology:  CT: Closed loop small bowel obstruction    Assessment:  1) closed-loop small bowel obstruction  2) severe COPD on home oxygen and multiple medications  3) severe protein calorie malnutrition  4) history of previous small bowel obstruction  5) history of cholecystectomy and bile leak  6) history of breast cancer and recurrent breast cancer  7) alcohol dependency  8) tobacco use    This is a 78 y.o. female with closed-loop small bowel obstruction of uncertain etiology.  She has evidence of peritonitis on exam.    Plan:  Surgery tonight for exploration and repair or resection as necessary depending on intraoperative findings.    The patient and I, as well as participating family members, discussed the recommendation for surgery as well as alternatives. We discussed the pertinent preoperative, intraoperative, and postoperative aspects of the procedure including anticipated recovery and how that could be affected by potential complications.    We discussed potential risks from the surgery including but not limited to:  Bleeding and possible need for transfusion, if applicable  Infection of the incision or any potential implanted materials  Injury to nearby vessels or nerves  Injury to nearby organs  Possible need for ostomy  Possible inability to close the abdomen, need for a wound vac, and subsquent return to OR at a later date to close the abdomen  Risks of anesthesia  We also discussed global risks including but not limited to:  Blood clots  Stroke  Heart attack  Pulmonary complications  Not surviving the operation or the recovery.    All questions were  answered. They understand and agree to proceed. Informed consent was obtained.  2 daughters were present for this conversation and their questions were addressed as well.    Patient will go to the  surgical ICU postoperatively, and most likely will remain intubated.    Christopher Yañez M.D.  [facility]  [phone]  Cell: [phone]

## 2018-11-05 NOTE — ED TRIAGE NOTES
Assist RN: Pt. Brought back to [address] via wheelchair. Pt. Changed into hospital gown, hooked up to monitoring equipment, IV started, labs and 1st set of blood cultures drawn and sent. Pt. Provided warm blankets for comfort. Pt. Placed on 4L of O2 per NC as pt. States she wears 4L continuously at home due to COPD. Pt. States abdominal pain last week that is centralized in nature that went away and then returned tonight with severity. Pt. Describes the pain as sharp and stabbing in nature. Pt. States hx of multiple bowel obstructions and chronic constipation. Pt. Has accompanying N/V. Pt. And family updated on the POC for ERP to see. Call light within reach. Will continue to monitor.

## 2018-11-06 PROBLEM — E87.5 HYPERKALEMIA: Status: RESOLVED | Noted: 2018-01-01 | Resolved: 2018-01-01

## 2018-11-06 NOTE — CARE PLAN
Problem: Skin Integrity  Goal: Risk for impaired skin integrity will decrease  Outcome: PROGRESSING AS EXPECTED  Turn every two hours and mepilex provided

## 2018-11-06 NOTE — PROGRESS NOTES
2RN skin check complete, pt cachectic, bony promininces padded, no breakdown at this time. Midline abd incision with abthera in place

## 2018-11-06 NOTE — NON-PROVIDER
Patient Summary: Ms. Campbell is a 78 year old female who is hospital day two of proximal small bowel resection and damage control closure. She is currently day 2 of Rocephin and Flagyl.    24 Hour Events:  The patient's BP has been stable in the 120's-140's systolic over the past 24 hours after receiving a few fluid boluses overnight. Her urine output has been 30cc/hr. Her abthera put out 1600 cc of serosanguinous fluid over night. Patient was breathing spontaneously this morning on the vent. There are no signs of infection, her incision site looks clean and there is no purulent drainage from it. She is on trickle tube feeds at 10.    SUBJECTIVE/OBJECTIVE:  NEURO:  GCS  24hr: 15 on arrival to the ED   Current: 8/9T   Exam Moving all extremities to pain, alert, pupils equal and reactive to light   Meds/Pain Propofol IV @ 5.2 ml/hr, Fentanyl IV @ 50 mcg/hr, Fentanyl IM 25 mcg q3hr prn, Toradol IV 15mg q6hr,     Labs None   Imaging None     RESP:  RR, SpO2 (18-22); 18, (%); 96% on ventilator   Vent PEEP 8, FiO2 100%, RR 22; On ASV   Exam Hyperresonant breath sound bilaterally, no wheezes, rales, or rhonchi   Meds Symbicort 160-4.5 MCG/ACT 2 puffs BID (held), DuoNeb 3 ml qhr prn, and Spiriva 18 mcg 1 inhalation daily   Labs CO2 19, Anion Gap 4   Imaging CXR 11/5 showed pulmonary expansion, consistent with COPD and left subclavian catheter tip project over the superior vena cava. No definite pneumothorax is identified.     CV:  HR/BP/MAP/  CVP HR (); 81/BP (111-164/); 146/69   Exam Regular rate and rhythm, no murmurs, rubs, or gallops   Meds Hydrazaline 10 mg qhr prn if SBP > 160, Labetalol 10 mg IV if SBP > 160, Levophed 8 mg in  ml at rate of 46.9 mlk/hr (discontinued at 0400), Vasopressin 20 units in  ml at rate of 9 ml/hr (discontinued at 0400)   Labs RBC 2.87, H&H 8.8/27.5, MCV 95.8, Platelets 199   Imaging None     GI:  Diet/Bowel Function Enteral Tube Feeding - Trickle feeds at 10    Exam Abthera in place in central abdomen, no signs of erythema or purulent drainage, output from Abthera 1600 cc overnight   Labs WBC 11.4, K+ 4.5, Glucose 101, AST 34, ALT 9, Albumin 2.4, Total protein 3.9, Globulin 1.5, Lactic Acid 2.6   Imaging CT-A/P 11/4 showed a mechanical small bowel obstruction with a closed loop. No pneumatosis or pneumoperitoneum is appreciated. Small amount of ascites is suggestive of developing ischemia.     F/E/N-:  I/O  24hr totals: 450 cc   Intake: NS at 100 cc/hr   Output: 30 cc/hr over the past two hours                                      Exam Chambers catheter in place   Meds NS at 100 cc/hr   Labs Na+ 142, K+ 4.5, Cl 119, BUN 26, Cr 1.06   Nutrition Tube Feeds through Core tract - trickle feeds at 10     HEME:  Labs RBC 2.87, H&H 8.8/27.5, MCV 95.8, Platelets 199   Transfusions None   Imaging None     ID:  Temp  24hr: 36.4-37.1   Tmax: 37.1  (98.8)   Labs WBC 11.4, Neutrophils-poly 84.7, Absolute Neutrophils 9.66   Micro Preliminary blood cultures negative   ABX Rocephin 1 g IV daily, Flagyl 500 mg IV q8hr - DAY 2     ENDOCRINE:  Blood Sugar 101   Meds Humulin 1-6 units q6h     MUSCULOSKELETAL:  Imaging None   WB Status On ventilator, unable to ambulate     SKIN:  Exam Cool to the touch, dry, several areas of ecchymosis   Interventions Check for pressure wound q shift     ASSESSMENT/PLAN:  NEURO: Patient is alert with GCS of 8/9T. She is moving all extremities and pupils are equal and reactive to light. Continue neuro checks every shift. Patient with a history of alcohol abuse and will monitor for alcohol withdrawal.    RESP: Patient has a history of COPD. She is currently on the ventilator and stable. Was breathing spontaneously on the vent today for 1.5 hours and was placed back on ASV. She will remain intubated and on the ventilator until she goes back to surgery in the next day or two for closure of her abdominal incision. Can restart home medications of Spiriva, DuoNeb,  Symbicort, and Combivent once extubated.     CV: Blood pressure has been stable since admission to the unit (SBP 90's-110's). Stop Vasopressin and Norepi given her stable blood pressure. If SBP gets above 160 can give patient Labetalol.     GI: Patient had bowel resection and primary anastomosis of proximal small bowel obstruction with volvulus by Dr. Yañez on 11/4/19.  Abthera in place from Ex. Lap site with adequate drainage of 1600 cc overnight. There appears to be no signs of infection from the wound site as there is no surrounding erythema or drainage. Continue to check dressing for signs of infection. If patient continues to be stable from a hemodynamic standpoint, Dr. Yañez will take her back to the OR for re-exploration and closure of abdominal incision. Follow-up with Dr. Yañez for surgical planning.    FEN-: She is currently on trickle tube feeds at 10. She has been tolerating this well. Increase tube feeds to 35. Her K+ is normal at 4.5. Her urine output decreased last night to 10cc/hr. She was given a total of 2.5 L of NS with improvement of urine output to 30 cc/hr over the past two hours. IV fluids running at  cc/hr. Repeat BMP in 12 hours to monitor electrolytes.    HEME: Patient is hemodynamically stable at this time. There is no evidence of active intra-abdominal bleed. Decrease in H&H is likely dilutional. Continue to monitor with daily CBC for evidence of intra-abdominal bleeds.    ID: Patient has been afebrile with a mildly elevated WBC count. Resolving sepsis with ongoing resuscitation. Follow sepsis bundle -  Day 2 of antibiotics following source control. Continue IV Rocephin 1 g daily and IV Flagyl 500 mg IV q8hr. Repeat CBC and lactate q12 hours.     ENDOCRINE: Patient has blood sugar of 101. Continue to monitor.    MUSCULOSKELETAL: Patient can move to edge of bed with assistance if tolerated. Continue DVT prophylaxis of Lovenox 40 mg IM daily.     SKIN: Continue daily skin  checks to assess for pressure wounds. Place heating blanket on patient given that she is cool to the touch.    PROPHYLAXIS:  DVT  Lovenox 40 mg IM daily   GI  Dulocolax suppository 10 mg daily, Colace 100 mg po BID, Pepcid 20 mg IV BID, Milk of Magnesia 30 mL po daily, Miralax packet BID   Restraints  Present in bilateral upper extremities     LINES/TUBES/CATHETERS:  Radial Art line, Central line  Chambers catheter

## 2018-11-06 NOTE — CARE PLAN
Problem: Ventilation Defect:  Goal: Ability to achieve and maintain unassisted ventilation or tolerate decreased levels of ventilator support  Adult Ventilation Update    Total Vent Days: 1    , +8, 30%    Patient Lines/Drains/Airways Status    Active Airway     Name: Placement date: Placement time: Site: Days:    Airway ETT Oral 8.0 11/05/18      Oral   less than 1              Cough: Non Productive (11/05/18 1600)  Sputum Amount: Unable to Evaluate (11/05/18 1600)  Sputum Color: Unable to Evaluate (11/05/18 1600)  Sputum Consistency: Unable to Evaluate (11/05/18 1600)    Mobility  Level of Mobility: Level IV (11/05/18 0800)  Activity Performed: Unable to mobilize (11/05/18 0800)  Reason Not Mobilized: Bed rest (11/05/18 0800)  Mobilization Comments: per MD, hemodynamically unstable, BP drops w/turns (11/05/18 0800)    Events/Summary/Plan: ABG drawn, vent mode changed to ASV  (11/05/18 0904) no SBTs today, held per Dr. De Guzman, will continue to monitor

## 2018-11-06 NOTE — DIETARY
"Nutrition Support Assessment     Nutrition services:   Day 1 of admit.  77 yo female with admitting diagnosis: small bowel obstruction    Current problem list:  1. Closed loop small bowel obstruction  2. History of severe COPD, home O2 dependent, breast cancer, ETOH, smoker    Assessment:  Estimated Nutritional Needs: based on: height 5'2\", weight 43.092 kg, IBW 49.896 kg, BMI 17.38    Calculation/Equation MSJ x 1.2 = 1050  Calories/day: 1050 - 1200 kcals (24 - 27 kcals/kg)  Protein/day: 52 - 60 g (1.2 - 1.4 g/kg)    Evaluation:   1. Pt on vent in need of nutrition support  2. cortrak placed for enteral access with confirmation in the duodenum  3. Pt was on pressors which are currently paused    4. Pt with low BMI however weight has increased 3 kg since previous admit in august 2017.    5. Prealbumin decreased secondary to stress response and inflammation.   6.   Pt will benefit from peptide based formula for ease of absorption post bowel resection  7.   Discussed in rounds this morning and will like begin tube feeding today depending on need for pressors.    Recommendations/Plan:  1. When tube feedings begin start Impact 1.5 @ 10 ml/hr and advance per MD orders  2. Full goal for Impact 1.5 @ 35 ml/hr will provide 1260 kcals, 79 g protein, 648 ml H20/day    "

## 2018-11-06 NOTE — CARE PLAN
Problem: Nutritional:  Goal: Nutrition support tolerated and meeting greater than 85% of estimated needs  Outcome: MET Date Met: 11/06/18  Impact 1.5 advanced to full goal 35 ml/hr

## 2018-11-06 NOTE — CARE PLAN
Problem: Safety  Goal: Will remain free from injury  Outcome: PROGRESSING AS EXPECTED  Bed is in a low and locked position. Alarm is on and pt close to nursing station.

## 2018-11-06 NOTE — CARE PLAN
Problem: Infection  Goal: Will remain free from infection    Intervention: Assess signs and symptoms of infection  Patient assessed with each encounter for signs of worsening infection. Antibiotics administered as ordered. Asceptic/sterile technique implemented where indicated.       Problem: Knowledge Deficit  Goal: Knowledge of disease process/condition, treatment plan, diagnostic tests, and medications will improve    Intervention: Explain information regarding disease process/condition, treatment plan, diagnostic tests, and medications and document in education  Family educated regarding plan of care, including next scheduled operation and expected time frame for extubation as provided by the MD. Education was also provided regarding tube feed advancement, electrolyte replacement, and basics of sepsis treatment.

## 2018-11-07 NOTE — CARE PLAN
Problem: Safety  Goal: Will remain free from injury  Outcome: PROGRESSING AS EXPECTED  Bed rails up x3, bed alarm on, call light with in reach, frequent rounding in place      Problem: Knowledge Deficit  Goal: Knowledge of the prescribed therapeutic regimen will improve  Outcome: PROGRESSING AS EXPECTED  .    Problem: Psychosocial Needs:  Goal: Level of anxiety will decrease  Outcome: PROGRESSING AS EXPECTED  Pt updated on POC and reasoning behind therapies and interventions. Pt understands and encouraged to ask questions

## 2018-11-07 NOTE — PROGRESS NOTES
Pt to pre-op with ACLS RT x2 and RN.    VSS, no apparent distress. Consents signed and report given to OR RN and Anesthesiologist

## 2018-11-07 NOTE — PROGRESS NOTES
Pt back from OR. VSS. Wound vac with abthera foam in place. CDI. Skin assessed, no other areas of concern at this time. Preventative measures in place.    2 RN skin check

## 2018-11-07 NOTE — CARE PLAN
Problem: Ventilation Defect:  Goal: Ability to achieve and maintain unassisted ventilation or tolerate decreased levels of ventilator support  Adult Ventilation Update    Total Vent Days: 3    Patient Lines/Drains/Airways Status    Active Airway     Name: Placement date: Placement time: Site: Days:    Airway ETT Oral 8.0 11/05/18      Oral   2              In the last 24 hours, the patient tolerated SBT for 1.45 hrs     #FVC / Vital Capacity (liters) : 0.55 (11/06/18 1642)  NIF (cm H2O) : -12 (11/06/18 1642)  Rapid Shallow Breathing Index (RR/VT): 42 (11/06/18 1642)  Plateau Pressure (Q Shift): 15 (11/06/18 1009)  Static Compliance (ml / cm H2O): 99 (11/07/18 0316)    Patient failed trials because of Barriers to Wean: Other (Comments) (hold at this time per Dr. De Guzman) (11/05/18 0742)  Barriers to SBT Weaning Trial Stopped due to:: Pt weaned for 1 hour and returned to rest settings per protocol (11/06/18 1642)  Length of Weaning Trial Length of Weaning Trial (Hours): 1 hr 45 mins (11/06/18 1642)      Cough: Non Productive (11/07/18 0400)  Sputum Amount: Scant (11/06/18 2306)  Sputum Color: Clear (11/06/18 2306)  Sputum Consistency: Thin (11/06/18 2306)    Mobility  Level of Mobility: Level IV (11/06/18 0800)  Activity Performed: Unable to mobilize (11/06/18 2000)  Pt Calls for Assistance: No (11/06/18 2000)  Reason Not Mobilized: Unstable condition (unstable hypotension with any change of position.) (11/06/18 2000)  Mobilization Comments:  (hemodynamically unstable. ) (11/06/18 2000)    Events/Summary/Plan: pt placed back on rest settings (11/06/18 1642)

## 2018-11-07 NOTE — PROGRESS NOTES
"General Surgery    No acute changes  Stable on vent  TFs held for possible closure today  BP (!) 113/38   Pulse (P) 95   Temp (!) 34.6 °C (94.2 °F) Comment: bear hugger applied  Resp (P) 17   Ht 1.575 m (5' 2\")   Wt 47.7 kg (105 lb 2.6 oz)   SpO2 (P) 96%   BMI 19.23 kg/m²   Vac CDI  NGT and NJT in place    WBC normal  Hgb 11->8.8->7.7 today, no evidence of active bleeding    A/P)  11/5: ex lap, SBR, Abthera    Doing well  Possible closure today.  Appreciate ICU team's help managing Ms. Campbell.    Christopher Yañez MD  [facility] (General and Vascular Surgery)  Cell: [phone] (text or call is fine, if you don't reach me please try my office)  Office: [phone]  __________________________________________________________________  Patient:Leanna Campbell   MRN:[id number]   CSN:[id number]    11/7/2018    12:57 PM      "

## 2018-11-07 NOTE — PROGRESS NOTES
Talked to Dr. De Guzman with new orders. He said it is okay to give a 500 NS bolus up to 4 times over night if U/O is <50 every hour. Goal is to have 20-30 ml/hr. Also he wants to stop tube feeds at 0400 on 11/7/18 for surgery.

## 2018-11-07 NOTE — PROGRESS NOTES
"BP (!) 113/38   Pulse 86   Temp (!) 34.6 °C (94.2 °F) Comment: bear hugger applied  Resp 13   Ht 1.575 m (5' 2\")   Wt 47.7 kg (105 lb 2.6 oz)   SpO2 96%   BMI 19.23 kg/m²     Case discussed with bedside nurse  Urine output 300ml / 12 hours  No boluses required overnight    Continue current management      "

## 2018-11-07 NOTE — PROGRESS NOTES
Trauma / Surgical Daily Progress Note    Date of Service  11/7/2018    Chief Complaint  78 y.o. female admitted 11/4/2018 with Bowel obstruction (HCC)    Interval Events  Interval laparotomy, planned delayed abdominal closure.    Review of Systems  Review of Systems   Unable to perform ROS: Acuity of condition        Vital Signs for last 24 hours  Pulse:  [81-93] (P) 93  Resp:  [13-29] (P) 16    Hemodynamic parameters for last 24 hours  CVP:  [5 MM HG-317 MM HG] (P) 9 MM HG    Respiratory Data     Respiration: (P) 16, Pulse Oximetry: (P) 96 %     Work Of Breathing / Effort: (P) Vented  RUL Breath Sounds: (P) Crackles, RML Breath Sounds: (P) Crackles, RLL Breath Sounds: (P) Diminished, ROSA Breath Sounds: (P) Crackles, LLL Breath Sounds: (P) Diminished    Physical Exam  Physical Exam   Constitutional: She appears cachectic. She is easily aroused. She appears ill. She is sedated and intubated.   HENT:   Head: Normocephalic and atraumatic.   Eyes: Pupils are equal, round, and reactive to light. Conjunctivae and EOM are normal.   Neck: Normal range of motion. Neck supple. No tracheal deviation present.   Cardiovascular: Normal rate, regular rhythm and intact distal pulses.    Pulmonary/Chest: She is intubated. No respiratory distress.   Hyperresonant breath sounds bilaterally  Stigmata of chronic COPD   Abdominal: Soft. There is tenderness.   AbThera dressing intact   Genitourinary:   Genitourinary Comments: Mildly concentrated clear yellow urine   Musculoskeletal: Normal range of motion. She exhibits no edema or deformity.   Neurological: She is easily aroused. She exhibits normal muscle tone. Coordination normal.   Skin: Skin is warm and dry.   Psychiatric:   Unable to assess currently   Nursing note and vitals reviewed.      Laboratory  Recent Results (from the past 24 hour(s))   ACCU-CHEK GLUCOSE    Collection Time: 11/06/18 11:52 AM   Result Value Ref Range    Glucose - Accu-Ck 104 (H) 65 - 99 mg/dL   ACCU-CHEK  GLUCOSE    Collection Time: 11/06/18  5:36 PM   Result Value Ref Range    Glucose - Accu-Ck 99 65 - 99 mg/dL   ACCU-CHEK GLUCOSE    Collection Time: 11/06/18 11:30 PM   Result Value Ref Range    Glucose - Accu-Ck 91 65 - 99 mg/dL   CBC WITH DIFFERENTIAL    Collection Time: 11/07/18  5:44 AM   Result Value Ref Range    WBC 10.0 4.8 - 10.8 K/uL    RBC 2.56 (L) 4.20 - 5.40 M/uL    Hemoglobin 7.7 (L) 12.0 - 16.0 g/dL    Hematocrit 24.4 (L) 37.0 - 47.0 %    MCV 95.3 81.4 - 97.8 fL    MCH 30.1 27.0 - 33.0 pg    MCHC 31.6 (L) 33.6 - 35.0 g/dL    RDW 51.7 (H) 35.9 - 50.0 fL    Platelet Count 227 164 - 446 K/uL    MPV 10.7 9.0 - 12.9 fL    Nucleated RBC 0.00 /100 WBC    NRBC (Absolute) 0.00 K/uL    Neutrophils-Polys 78.90 (H) 44.00 - 72.00 %    Lymphocytes 7.90 (L) 22.00 - 41.00 %    Monocytes 7.90 0.00 - 13.40 %    Eosinophils 0.00 0.00 - 6.90 %    Basophils 0.00 0.00 - 1.80 %    Neutrophils (Absolute) 8.33 (H) 2.00 - 7.15 K/uL    Lymphs (Absolute) 0.79 (L) 1.00 - 4.80 K/uL    Monos (Absolute) 0.79 0.00 - 0.85 K/uL    Eos (Absolute) 0.00 0.00 - 0.51 K/uL    Baso (Absolute) 0.00 0.00 - 0.12 K/uL    Anisocytosis 1+     Microcytosis 1+    COMP METABOLIC PANEL    Collection Time: 11/07/18  5:44 AM   Result Value Ref Range    Sodium 143 135 - 145 mmol/L    Potassium 4.7 3.6 - 5.5 mmol/L    Chloride 122 (H) 96 - 112 mmol/L    Co2 19 (L) 20 - 33 mmol/L    Anion Gap 2.0 0.0 - 11.9    Glucose 96 65 - 99 mg/dL    Bun 36 (H) 8 - 22 mg/dL    Creatinine 0.76 0.50 - 1.40 mg/dL    Calcium 8.1 (L) 8.5 - 10.5 mg/dL    AST(SGOT) 37 12 - 45 U/L    ALT(SGPT) 11 2 - 50 U/L    Alkaline Phosphatase 32 30 - 99 U/L    Total Bilirubin 0.3 0.1 - 1.5 mg/dL    Albumin 2.4 (L) 3.2 - 4.9 g/dL    Total Protein 4.3 (L) 6.0 - 8.2 g/dL    Globulin 1.9 1.9 - 3.5 g/dL    A-G Ratio 1.3 g/dL   LACTIC ACID    Collection Time: 11/07/18  5:44 AM   Result Value Ref Range    Lactic Acid 1.0 0.5 - 2.0 mmol/L   MAGNESIUM    Collection Time: 11/07/18  5:44 AM   Result  Value Ref Range    Magnesium 2.6 (H) 1.5 - 2.5 mg/dL   PHOSPHORUS    Collection Time: 11/07/18  5:44 AM   Result Value Ref Range    Phosphorus 3.1 2.5 - 4.5 mg/dL   Triglycerides Starting now and then Every 3 Days    Collection Time: 11/07/18  5:44 AM   Result Value Ref Range    Triglycerides 37 0 - 149 mg/dL   ESTIMATED GFR    Collection Time: 11/07/18  5:44 AM   Result Value Ref Range    GFR If African American >60 >60 mL/min/1.73 m 2    GFR If Non African American >60 >60 mL/min/1.73 m 2   MORPHOLOGY    Collection Time: 11/07/18  5:44 AM   Result Value Ref Range    RBC Morphology Present     Poikilocytosis 2+     Smudge Cells Moderate     Basophilic Stippling Moderate    PERIPHERAL SMEAR REVIEW    Collection Time: 11/07/18  5:44 AM   Result Value Ref Range    Peripheral Smear Review see below    DIFFERENTIAL MANUAL    Collection Time: 11/07/18  5:44 AM   Result Value Ref Range    Bands-Stabs 4.40 0.00 - 10.00 %    Metamyelocytes 0.90 %    Manual Diff Status PERFORMED    PLATELET ESTIMATE    Collection Time: 11/07/18  5:44 AM   Result Value Ref Range    Plt Estimation Normal    ACCU-CHEK GLUCOSE    Collection Time: 11/07/18  6:08 AM   Result Value Ref Range    Glucose - Accu-Ck 82 65 - 99 mg/dL       Fluids    Intake/Output Summary (Last 24 hours) at 11/07/18 1119  Last data filed at 11/07/18 1000   Gross per 24 hour   Intake          3627.25 ml   Output             1765 ml   Net          1862.25 ml       Core Measures & Quality Metrics  Labs reviewed, Medications reviewed and Radiology images reviewed  Chambers catheter: Critically Ill - Requiring Accurate Measurement of Urinary Output  Central line in place: Need for access, Sepsis and Vasopressors    DVT Prophylaxis: Heparin  DVT prophylaxis - mechanical: SCDs  Ulcer prophylaxis: Yes  Antibiotics: Treating active infection/contamination beyond 24 hours perioperative coverage      TIM Score  ETOH Screening    Assessment/Plan  Sepsis following intra-abdominal  surgery   Assessment & Plan    Resolving septic shock secondary to small bowel volvulus and enteric contamination.  11/5 exploratory laparotomy with bowel resection and damage control closure.  Sepsis bundle implemented. Trending endpoints of resuscitation.  Weaning vasoactive medications.  11/7 Antibiotic day 3.     Small bowel volvulus (HCC)   Assessment & Plan    Acute volvulus of the proximal jejunum with closed loop obstruction and necrotic intervening bowel.  11/5 Exploratory laparotomy with bowel resection and primary anastomosis.  Damage control closure for enteric contamination.  11/5 Ceftriaxone and metronidazole day #1 following definitive source control.  Plan second look laparotomy with ABThera dressing change versus delayed primary fascial closure.  Christopher Yañez MD.  General and Vascular Surgery.     Acute respiratory failure following trauma and surgery ([facility])- (present on admission)   Assessment & Plan    Ventilatory support following emergent abdominal surgery.  Continue full mechanical ventilatory support. Ventilator bundle and Trauma weaning protocol.     Alcohol abuse- (present on admission)   Assessment & Plan    Habitual daily alcohol use.  Alcohol withdrawal surveillance.     COPD (chronic obstructive pulmonary disease) ([facility])- (present on admission)   Assessment & Plan    Oxygen dependent COPD.    Chronic condition treated with Spiriva, DuoNeb, Symbicort, and Combivent.  Resume maintenance medications following extubation..         Discussed patient condition with Family, RN, RT, Pharmacy, Dietary and .  The patient remains critically ill with multiorgan failure.  High risk for acute and sudden deterioration and worsening vital organ function.   I provided the following critical care services: ventilator management.    Plan abdominal washout and delayed primary facial closure. The operative strategy was explained and reviewed. Alternatives discussed. Potential complications  including, but not limited to, infection, bleeding, damage to adjacent structures, and anesthetic complications were discussed. Questions were elicited and answered to the patient's family's satisfaction. Operative consent signed.  .  CRITICAL CARE TIME EXCLUDING PROCEDURES: 37 minutes

## 2018-11-07 NOTE — PROGRESS NOTES
Left radial art line leaking with dampened waveform. OK to d/c per Dr. De Guzman. Art line removed, pressure held for five minutes and dressing applied.

## 2018-11-07 NOTE — PROGRESS NOTES
Dr. De Guzman notified of low urine output. Orders received for 500 cc bolus of NS once. Will continue to monitor.

## 2018-11-07 NOTE — NON-PROVIDER
Patient Summary: Ms. Campbell is a 78 year old female who is hospital day three of a proximal small bowel resection and damage control closure. She is currently day 3 of Rocephin and Flagyl.    24 Hour Events:  The patient did not require any boluses of NS overnight, her blood pressure has been stable in the 110-120's systolic. She tolerated a SBT this morning for 1.45 hrs and was placed back on the vent. Her propofol drip has been turned off. She is more alert and responsive this morning. Pt has had a urine output of roughly 25 cc/hr overnight. Her pain is well managed. She has had 400 cc drainage of serosanguinous fluid out of her AbThera overnight.     SUBJECTIVE/OBJECTIVE:  NEURO:  GCS  24hr: 15 on arrival to the ED   Current: 10T   Exam Moving all extremities to pain, alert, pupils equal and reactive to light, More alert and responsive this morning   Meds/Pain Propofol IV @ 5.2 ml/hr (STOPPED), Fentanyl IV @ 50 mcg/hr, Fentanyl IM 25 mcg q3hr prn, Toradol IV 15mg q6hr,     Labs None   Imaging None     RESP:  RR, SpO2 (13-29); 13, (%); 96% on ventilator   Vent PEEP 8, FiO2 100%, RR 22; On ASV   Exam Hyperresonant breath sound bilaterally, no wheezes, rales, or rhonchi   Meds Symbicort 160-4.5 MCG/ACT 2 puffs BID (held), DuoNeb 3 ml qhr prn, and Spiriva 18 mcg 1 inhalation daily   Labs CO2 19, Anion Gap 2   Imaging CXR 11/7 showed multiple devices seen again, stable. No evidence of focal consolidation or evidence of pulmonary edema. No pleural effusion. The heart is normal in size.     CV:  HR/BP/MAP/  CVP HR (77-91); 86/BP (120-180/56-78); 122/70   Exam Regular rate and rhythm, no murmurs, rubs, or gallops   Meds Hydrazaline 10 mg qhr prn if SBP > 160, Labetalol 10 mg IV if SBP > 160, Levophed 8 mg in  ml at rate of 46.9 mlk/hr (discontinued at 0400), Vasopressin 20 units in  ml at rate of 9 ml/hr (discontinued at 0400)   Labs RBC 2.56, H&H 7.7/24.4, MCV 95.3, Platelets 227   Imaging None      GI:  Diet/Bowel Function Enteral Tube Feeding - Feeds at 35 mL/hr, stopped at 0400 on 11/7   Exam Abthera in place in central abdomen, no signs of erythema or purulent drainage, output from Abthera 1600 cc overnight   Labs WBC 10, K+ 4.7, Glucose 96, AST 37, ALT 11, Albumin 2.4, Total protein 4.3, Globulin 1.9, Lactic Acid 1.0   Imaging CT-A/P 11/4 showed a mechanical small bowel obstruction with a closed loop. No pneumatosis or pneumoperitoneum is appreciated. Small amount of ascites is suggestive of developing ischemia.     F/E/N-:  I/O  24hr totals: 300cc   Intake: NS at 100 cc/hr   Output: 25 cc/hr overnight                                     Exam Chambers catheter in place   Meds NS at 100 cc/hr   Labs Na+ 143, K+ 4.7, Cl 122, BUN 36, Cr 0.76   Nutrition Tube Feeds through Core tract - trickle feeds at 35 cc/hr     HEME:  Labs RBC 2.56, H&H 7.7/24.4, MCV 95.3, Platelets 227   Transfusions None   Imaging None     ID:  Temp  24hr: 36.8-37.4   Tmax: 37.4  (99.3)   Labs WBC 10.0   Micro Preliminary blood cultures negative   ABX Rocephin 1 g IV daily, Flagyl 500 mg IV q8hr - DAY 2     ENDOCRINE:  Blood Sugar 82   Meds Humulin 1-6 units q6h     MUSCULOSKELETAL:  Imaging None   WB Status On ventilator, unable to ambulate     SKIN:  Exam Cool to the touch, dry, several areas of ecchymosis   Interventions Check for pressure wound q shift     ASSESSMENT/PLAN:  NEURO: Patient is alert with GCS of 10T. She is moving all extremities and pupils are equal and reactive to light. She is more responsive and alert today with propofol turned off. Continue neuro checks every shift. Patient with a history of alcohol abuse and will monitor for alcohol withdrawal.    RESP: Patient has a history of COPD. She is currently on the ventilator and stable. Underwent SBT this morning for 1.45 hour and was placed back on the vent. She will remain intubated and on the ventilator until she goes back to surgery this afternoon for closure of  her abdominal incision. Can restart home medications of Spiriva, DuoNeb, Symbicort, and Combivent once extubated.     CV: Blood pressure has been stable since admission to the unit ('s-120's). Stop Vasopressin and Norepi given her stable blood pressure. If SBP gets above 160 can give patient Labetalol.     GI: Patient had bowel resection and primary anastomosis of proximal small bowel obstruction with volvulus by Dr. Yañez on 11/4/19.  Abthera in place from Ex. Lap site with adequate drainage of 400 cc overnight. There appears to be no signs of infection from the wound site as there is no surrounding erythema or drainage. Continue to check dressing for signs of infection. Patient will go to the OR this afternoon with Dr. De Guzman for closure of her abdominal incision.    FEN-: Her feeds of 35 ml/hr were stopped this morning at 0400 in preparation for surgery today. Her K+ is normal at 4.7. Her urine output was 25cc/hr last night. She did not require any NS boluses. IV fluids running at  cc/hr. Repeat BMP in the morning to monitor electrolytes.    HEME: Patient is hemodynamically stable at this time. There is no evidence of active intra-abdominal bleed. Decrease in H&H is likely dilutional. Continue to monitor with daily CBC for evidence of intra-abdominal bleeds.    ID: Patient has been afebrile with a normal WBC count of 10. Resolving sepsis with ongoing resuscitation. Follow sepsis bundle -  Day 3 of antibiotics following source control. Continue IV Rocephin 1 g daily and IV Flagyl 500 mg IV q8hr. Repeat CBC and lactate q12 hours.     ENDOCRINE: Patient has blood sugar of 82. Continue to monitor.    MUSCULOSKELETAL: Patient can move to edge of bed with assistance if tolerated. Continue DVT prophylaxis of Lovenox 40 mg IM daily.     SKIN: Continue daily skin checks to assess for pressure wounds. Place heating blanket on patient given that she is cool to the touch.    PROPHYLAXIS:  DVT  Lovenox 40 mg IM  daily   GI  Dulocolax suppository 10 mg daily, Colace 100 mg po BID, Pepcid 20 mg IV BID, Milk of Magnesia 30 mL po daily, Miralax packet BID   Restraints  Present in bilateral upper extremities     LINES/TUBES/CATHETERS:  Radial Art line, Central line  Chambers catheter

## 2018-11-07 NOTE — CARE PLAN
Problem: Safety  Goal: Will remain free from injury  Outcome: PROGRESSING AS EXPECTED  Bed is locked and in low position. Patient is close to nursing station. Bed rails are up X3.     Problem: Safety - Medical Restraint  Goal: Remains free of injury from restraints (Restraint for Interference with Medical Device)  INTERVENTIONS:  1. Determine that other, less restrictive measures have been tried or would not be effective before applying the restraint  2. Evaluate the patient's condition at the time of restraint application  3. Inform patient/family regarding the reason for restraint  4. Q2H: Monitor safety, psychosocial status, comfort, nutrition and hydration     Outcome: PROGRESSING AS EXPECTED  Checking under restraints every 2 hours. Cap refill is <3 seconds. Hands are warm to touch. Doing range of motion every 2 hours as well.

## 2018-11-07 NOTE — PROGRESS NOTES
Trauma / Surgical Daily Progress Note    Date of Service  11/6/2018    Chief Complaint  78 y.o. female admitted 11/4/2018 with Bowel obstruction (HCC)    Interval Events  Low rate tube feeding initiated.  Ongoing volume resuscitation.  Vasopressors titrated off.  Tolerated spontaneous breathing trial.  Lighten sedation.  Plan the interval second look laparotomy with probable fascial closure tomorrow.    Review of Systems  Review of Systems   Unable to perform ROS: Acuity of condition        Vital Signs for last 24 hours  Pulse:  [77-91] 87  Resp:  [12-29] 15    Hemodynamic parameters for last 24 hours  CVP:  [2 MM HG-8 MM HG] 8 MM HG    Respiratory Data     Respiration: 15, Pulse Oximetry: 96 %     Work Of Breathing / Effort: Vented  RUL Breath Sounds: Clear, RML Breath Sounds: Diminished, RLL Breath Sounds: Diminished, ROSA Breath Sounds: Clear, LLL Breath Sounds: Diminished    Physical Exam  Physical Exam   Constitutional: She appears cachectic. She is easily aroused. She appears ill. She is sedated and intubated.   HENT:   Head: Normocephalic and atraumatic.   Eyes: Pupils are equal, round, and reactive to light. Conjunctivae are normal.   Neck: Neck supple. No tracheal deviation present.   Cardiovascular: Normal rate, regular rhythm and intact distal pulses.    Pulmonary/Chest: She is intubated. No respiratory distress.   Hyperresonant breath sounds bilaterally  Stigmata of chronic COPD   Abdominal: Soft. There is tenderness.   AbThera dressing intact   Genitourinary:   Genitourinary Comments: Mildly concentrated clear yellow urine   Musculoskeletal: Normal range of motion. She exhibits no edema or deformity.   Neurological: She is easily aroused. She exhibits normal muscle tone. Coordination normal.   Psychiatric:   Unable to assess currently   Nursing note and vitals reviewed.      Laboratory  Recent Results (from the past 24 hour(s))   ACCU-CHEK GLUCOSE    Collection Time: 11/05/18  6:33 PM   Result Value Ref  Range    Glucose - Accu-Ck 117 (H) 65 - 99 mg/dL   BASIC METABOLIC PANEL    Collection Time: 11/05/18  6:35 PM   Result Value Ref Range    Sodium 140 135 - 145 mmol/L    Potassium 4.7 3.6 - 5.5 mmol/L    Chloride 114 (H) 96 - 112 mmol/L    Co2 20 20 - 33 mmol/L    Glucose 142 (H) 65 - 99 mg/dL    Bun 25 (H) 8 - 22 mg/dL    Creatinine 1.07 0.50 - 1.40 mg/dL    Calcium 8.9 8.5 - 10.5 mg/dL    Anion Gap 6.0 0.0 - 11.9   ESTIMATED GFR    Collection Time: 11/05/18  6:35 PM   Result Value Ref Range    GFR If African American >60 >60 mL/min/1.73 m 2    GFR If Non African American 50 (A) >60 mL/min/1.73 m 2   ACCU-CHEK GLUCOSE    Collection Time: 11/06/18  1:14 AM   Result Value Ref Range    Glucose - Accu-Ck 78 65 - 99 mg/dL   CRP Quantitive (Non-Cardiac)    Collection Time: 11/06/18  4:14 AM   Result Value Ref Range    Stat C-Reactive Protein 19.76 (H) 0.00 - 0.75 mg/dL   Prealbumin    Collection Time: 11/06/18  4:14 AM   Result Value Ref Range    Pre-Albumin 9.0 (L) 18.0 - 38.0 mg/dL   CBC WITH DIFFERENTIAL    Collection Time: 11/06/18  4:14 AM   Result Value Ref Range    WBC 11.4 (H) 4.8 - 10.8 K/uL    RBC 2.87 (L) 4.20 - 5.40 M/uL    Hemoglobin 8.8 (L) 12.0 - 16.0 g/dL    Hematocrit 27.5 (L) 37.0 - 47.0 %    MCV 95.8 81.4 - 97.8 fL    MCH 30.7 27.0 - 33.0 pg    MCHC 32.0 (L) 33.6 - 35.0 g/dL    RDW 49.6 35.9 - 50.0 fL    Platelet Count 199 164 - 446 K/uL    MPV 10.0 9.0 - 12.9 fL    Neutrophils-Polys 84.70 (H) 44.00 - 72.00 %    Lymphocytes 7.30 (L) 22.00 - 41.00 %    Monocytes 7.40 0.00 - 13.40 %    Eosinophils 0.00 0.00 - 6.90 %    Basophils 0.20 0.00 - 1.80 %    Immature Granulocytes 0.40 0.00 - 0.90 %    Nucleated RBC 0.00 /100 WBC    Neutrophils (Absolute) 9.66 (H) 2.00 - 7.15 K/uL    Lymphs (Absolute) 0.83 (L) 1.00 - 4.80 K/uL    Monos (Absolute) 0.84 0.00 - 0.85 K/uL    Eos (Absolute) 0.00 0.00 - 0.51 K/uL    Baso (Absolute) 0.02 0.00 - 0.12 K/uL    Immature Granulocytes (abs) 0.04 0.00 - 0.11 K/uL    NRBC  (Absolute) 0.00 K/uL   COMP METABOLIC PANEL    Collection Time: 11/06/18  4:14 AM   Result Value Ref Range    Sodium 142 135 - 145 mmol/L    Potassium 4.5 3.6 - 5.5 mmol/L    Chloride 119 (H) 96 - 112 mmol/L    Co2 19 (L) 20 - 33 mmol/L    Anion Gap 4.0 0.0 - 11.9    Glucose 101 (H) 65 - 99 mg/dL    Bun 26 (H) 8 - 22 mg/dL    Creatinine 1.06 0.50 - 1.40 mg/dL    Calcium 8.0 (L) 8.5 - 10.5 mg/dL    AST(SGOT) 34 12 - 45 U/L    ALT(SGPT) 9 2 - 50 U/L    Alkaline Phosphatase 34 30 - 99 U/L    Total Bilirubin 0.3 0.1 - 1.5 mg/dL    Albumin 2.4 (L) 3.2 - 4.9 g/dL    Total Protein 3.9 (L) 6.0 - 8.2 g/dL    Globulin 1.5 (L) 1.9 - 3.5 g/dL    A-G Ratio 1.6 g/dL   LACTIC ACID    Collection Time: 11/06/18  4:14 AM   Result Value Ref Range    Lactic Acid 1.5 0.5 - 2.0 mmol/L   MAGNESIUM    Collection Time: 11/06/18  4:14 AM   Result Value Ref Range    Magnesium 1.6 1.5 - 2.5 mg/dL   PHOSPHORUS    Collection Time: 11/06/18  4:14 AM   Result Value Ref Range    Phosphorus 4.7 (H) 2.5 - 4.5 mg/dL   ESTIMATED GFR    Collection Time: 11/06/18  4:14 AM   Result Value Ref Range    GFR If African American >60 >60 mL/min/1.73 m 2    GFR If Non African American 50 (A) >60 mL/min/1.73 m 2   ACCU-CHEK GLUCOSE    Collection Time: 11/06/18  6:17 AM   Result Value Ref Range    Glucose - Accu-Ck 79 65 - 99 mg/dL   ACCU-CHEK GLUCOSE    Collection Time: 11/06/18 11:52 AM   Result Value Ref Range    Glucose - Accu-Ck 104 (H) 65 - 99 mg/dL   ACCU-CHEK GLUCOSE    Collection Time: 11/06/18  5:36 PM   Result Value Ref Range    Glucose - Accu-Ck 99 65 - 99 mg/dL       Fluids    Intake/Output Summary (Last 24 hours) at 11/06/18 1830  Last data filed at 11/06/18 1600   Gross per 24 hour   Intake           4564.8 ml   Output              966 ml   Net           3598.8 ml       Core Measures & Quality Metrics  Labs reviewed, Medications reviewed and Radiology images reviewed  Chambers catheter: Critically Ill - Requiring Accurate Measurement of Urinary  Output  Central line in place: Need for access, Sepsis and Vasopressors    DVT Prophylaxis: Enoxaparin (Lovenox)  DVT prophylaxis - mechanical: SCDs  Ulcer prophylaxis: Yes  Antibiotics: Treating active infection/contamination beyond 24 hours perioperative coverage      TIM Score  ETOH Screening    Assessment/Plan  Sepsis following intra-abdominal surgery   Assessment & Plan    Resolving septic shock secondary to small bowel volvulus and enteric contamination.  11/5 exploratory laparotomy with bowel resection and damage control closure.  Sepsis bundle implemented. Trending endpoints of resuscitation.  Weaning vasoactive medications.  11/6 Antibiotic day 2.     Small bowel volvulus (HCC)   Assessment & Plan    Acute volvulus of the proximal jejunum with closed loop obstruction and necrotic intervening bowel.  11/5 Exploratory laparotomy with bowel resection and primary anastomosis.  Damage control closure for enteric contamination.  11/5 Ceftriaxone and metronidazole day #1 following definitive source control.  Interval second look laparotomy with AbThera dressing change versus delayed primary fascial closure.  Christopher Yañez MD.  General and Vascular Surgery.     Acute respiratory failure following trauma and surgery ([facility])- (present on admission)   Assessment & Plan    Ventilatory support following emergent abdominal surgery.  Continue full mechanical ventilatory support. Ventilator bundle and Trauma weaning protocol.     Alcohol abuse- (present on admission)   Assessment & Plan    Habitual daily alcohol use.  Alcohol withdrawal surveillance.     COPD (chronic obstructive pulmonary disease) ([facility])- (present on admission)   Assessment & Plan    Oxygen dependent COPD.    Chronic condition treated with Spiriva, DuoNeb, Symbicort, and Combivent.  Resume maintenance medications following extubation..         Discussed patient condition with Family, RN, RT, Pharmacy, Dietary and .  The patient remains  critically ill with multisystem organ failure.  High risk for acute and sudden deterioration and worsening vital organ function.   I provided the following critical care services: Ventilator management, sepsis resuscitation, and direction of multidisciplinary surgical critical care.    CRITICAL CARE TIME EXCLUDING PROCEDURES: 38 minutes

## 2018-11-07 NOTE — PROGRESS NOTES
2 Rn skin check completed    Midline wound vac site, clean and intact  ET tube in place, turned Q2 hours  Sacrum pink and blanching  Patient turned Q2 hours, pillow used to float heels

## 2018-11-08 PROBLEM — K55.029 ISCHEMIC NECROSIS OF SMALL BOWEL (HCC): Status: ACTIVE | Noted: 2018-01-01

## 2018-11-08 PROBLEM — D62 ANEMIA ASSOCIATED WITH ACUTE BLOOD LOSS: Status: ACTIVE | Noted: 2018-01-01

## 2018-11-08 NOTE — OP REPORT
DATE OF OPERATION: 11/7/2018    PREOPERATIVE DIAGNOSIS: Open abdomen.  Prior bile peritonitis.  Prior closed loop bowel obstruction with volvulus.    POSTOPERATIVE DIAGNOSIS: Open abdomen.  Prior bile peritonitis.  Multifocal small bowel necrosis involving the mid to distal jejunum and proximal to mid ileum.    PROCEDURE PERFORMED:  1.  Plan second look laparotomy.  2.  Extensive small bowel lysis of adhesions.  3.  Small bowel resection with primary stapled anastomosis.  4.  Damage control abdominal closure with ABThera negative pressure dressing (greater than 50 cm²).    SURGEON: Severo De Guzman M.D.    ASSISTANT: CAMILO Aviles.    ANESTHESIOLOGIST: Benjamín Galaviz MD.    ANESTHESIA:  General endotracheal anesthesia.    ASA CLASSIFICATION: III.    INDICATION:  The patient is a 78-year-old woman with a distant history of bile peritonitis and a recent surgery for a closed loop proximal small bowel obstruction and volvulus.  She underwent exploratory laparotomy with resection and anastomosis of a short segment of proximal jejunum and damage control abdominal closure. She is taken to the operating room today for a second look laparotomy and planned delayed primary fascial closure.    FINDINGS: There are multiple regions of segmental necrotic bowel scattered from the mid jejunum to mid ileum.  No areas of terry perforation.  The previous resection site and anastomosis were healthy and viable appearing.  No obvious vascular compromise to the large intestine or stomach.  There remains one segment of proximal jejunum that is of questionable viability.  This will be assessed at the next planned laparotomy.    WOUND CLASSIFICATION: Class III, Contaminated.    SPECIMEN: Distal jejunum and proximal ileum.    ESTIMATED BLOOD LOSS: Less than 50 mL.    DESCRIPTION OF PROCEDURE:  Following informed consent, the patient was properly identified, taken to the operating room, and placed in a supine position where general  endotracheal anesthesia was administered.  Intravenous antibiotics were administered by the anesthesiologist in the preoperative interval.  Sequential compression devices were employed.  The patient's ABThera negative pressure dressing was taken down. The operative site was widely prepped and draped into a sterile field.    The abdomen was explored with the findings as detailed above.  Extensive lysis of adhesions was carried out to free up the entirety the of the small bowel.  A single segment of clearly nonviable distal jejunum and proximal ileum was resected.  A functional end-to-end stapled anastomosis was performed.     A damage control closure was employed. An ABThera™ Open Abdomen Negative Pressure dressing was passed on to the operative field. The visceral protective layer was trimmed to the appropriate dimensions and placed within the abdominal cavity extending to the paracolic gutters and dependent pelvis ensuring full coverage of all viscera. The medial tension perforated foam units were trimmed to the appropriate dimensions and placed within the wound. The upper foam was secured to the skin in a radial fashion with interrupted staples. The self-adhesive drape was applied and connected to closed suction drainage.    The patient tolerated the procedure well, and there were no apparent complications.  All sponge, needle, and instrument counts were correct on 2 separate occasions. She was transferred to the surgical intensive care unit in guarded condition.       ____________________________________  Severo De Guzman M.D.    DD:  11/7/2018  4:16 PM

## 2018-11-08 NOTE — ASSESSMENT & PLAN NOTE
11/8 second look laparotomy demonstrated multifocal patchy necrosis of the distal jejunum and proximal ileum.  Segmental bowel resection with this.  11/9 :  Planned interval laparotomy to assess bowel viability SMA as source of emboli.

## 2018-11-08 NOTE — DISCHARGE PLANNING
"Care Transition Team Assessment      In the case of an emergency, pt's legal NOK is Anju Hunter (daughter) [phone] or Trista Chow (Daughter) [phone].    LSW met with pt's daughters at bedside and obtained the information used in this assessment. Pt verified accuracy of facesheet. Pt lives in a two story home with her daughter Anju. They have added a riding chair that goes up the stairs for pt.  Pt uses [facility] in [facility]. Prior to current hospitalization, pt was completely independent in ADLS/IADLS. Pt uses 4[employer] of O2 through [facility].  Pt drives and is able to attend necessary MD appointments. Pt receives [employer] and SSI and has no financial concerns. Pt has a good support system. Pt's dtrs stated that pt has a couple glasses of wine everyday and they stated this has been going on for \"forveer\" and they do feel it's an issue. They stated that they feel pt has an undiagnosed mental health disorder due to her impulsivity and how ruffin she is. They stated that pt has a gambling addiction as well. Pt's dtr stated that they would like pt to get some additional therapies upon d/c. LSW stated that we will discuss more of a discharge plan once pt is medically stable.       Information Source  Orientation : Unable to Assess  Information Given By: Relative  Informant's Name:  (Anju and Trista)  Who is responsible for making decisions for patient? : Adult child    Readmission Evaluation  Is this a readmission?: No    Elopement Risk  Legal Hold: No  Ambulatory or Self Mobile in Wheelchair: No-Not an Elopement Risk  Elopement Risk: Not at Risk for Elopement         Discharge Preparedness  What is your plan after discharge?: Uncertain - pending medical team collaboration, Skilled nursing facility  What are your discharge supports?: Child  Prior Functional Level: Ambulatory, Drives Self, Independent with Activities of Daily Living, Independent with Medication Management  Difficulity with " ADLs: Bathing, Brushing teeth, Dressing, Eating, Toileting, Walking  Difficulity with IADLs: Cooking, Driving, Keeping track of finances, Laundry, Managing medication, Shopping, Using the telephone or computer    Functional Assesment  Prior Functional Level: Ambulatory, Drives Self, Independent with Activities of Daily Living, Independent with Medication Management    Finances  Financial Barriers to Discharge: No  Prescription Coverage: Yes    Vision / Hearing Impairment  Right Eye Vision: Wears Glasses  Left Eye Vision: Wears Glasses         Advance Directive  Advance Directive?: None    Domestic Abuse  Have you ever been the victim of abuse or violence?: No    Psychological Assessment  History of Substance Abuse: Alcohol  Date Last Used - Alcohol:  (11/4/18)  History of Psychiatric Problems: No  Non-compliant with Treatment: No  Newly Diagnosed Illness: Yes    Discharge Risks or Barriers  Discharge risks or barriers?: Substance abuse  Patient risk factors: Complex medical needs    Anticipated Discharge Information  Anticipated discharge disposition: Acute rehab, Discharge needs currently unknown, DME, Home, SNF

## 2018-11-08 NOTE — PROGRESS NOTES
Pharmacy TPN Day # 1       2018    Dosing Weight   48 kg  TPN currently providing 50% of goal      TPN goal: 25 kcal/day including 1.5-2 gm/kg/day Protein      TPN indication: Bowel resection    Pertinent PMH:  Exploratory laparotomy for acute small bowel volvulus. Extensive bowel resection, planned delayed abdominal closure. Alcohol abuse history.    Temp (24hrs), Av.6 °C (99.7 °F), Min:37.5 °C (99.5 °F), Max:37.6 °C (99.7 °F)  .  Recent Labs      18   0414  18   0544  18   0410   SODIUM  142  143  146*   POTASSIUM  4.5  4.7  4.1   CHLORIDE  119*  122*  123*   CO2  19*  19*  19*   BUN  26*  36*  37*   CREATININE  1.06  0.76  0.76   GLUCOSE  101*  96  77   CALCIUM  8.0*  8.1*  7.9*   ASTSGOT  34  37  20   ALTSGPT  9  11  8   ALBUMIN  2.4*  2.4*  2.0*   TBILIRUBIN  0.3  0.3  0.2   PHOSPHORUS  4.7*  3.1  2.5   MAGNESIUM  1.6  2.6*  2.5   PREALBUMIN  9.0*   --    --      Accu-Checks  Recent Labs      18   0740  18   1217  18   1249   POCGLUCOSE  102*  59*  89       Vitals:    18 1110 18 1115 18 1130 18 1145   BP: 107/58 123/57 123/57 125/62   Weight:       Height:           Intake/Output Summary (Last 24 hours) at 18 1310  Last data filed at 18 1238   Gross per 24 hour   Intake          5203.92 ml   Output             2015 ml   Net          3188.92 ml       TPN for past 72 hours (Show up to 3 orders; newest on the left.)     Start date and time   2018      TPN Central Line Formulation [[id number]]    Order Status  Active       Base    Clinisol 15%  40 g    dextrose 70%  95 g    fat emulsions 20%  12 g       Additives    potassium phosphates  20 mmol    sodium acetate  210 mEq    sodium chloride  70 mEq    calcium GLUConate  4.65 mEq    M.T.E. -5 Adult  1 mL    M.V.I. ADULT  10 mL       Energy Contribution    Proteins  --    Dextrose  --    Lipids  --    Total  0 kcal       Electrolyte Ion Calculated Amount    Sodium  --     Potassium  --    Calcium  --    Magnesium  --    Aluminum  --    Phosphate  --    Chloride  --    Acetate  --       Other    Total Protein  --    Total Protein/kg  --    Glucose Infusion Rate  --    Osmolarity  --    Volume  2,400 mL    Rate  100 mL/hr    Dosing Weight  47.9 kg    Infusion Site  Central          This formula provides:  % kcal as lipids = 18  Grams protein/kg = 0.8  Non-protein calories = 440  Kcals/kg = 12.5  Total daily calories = 600    A/P:  1. Macronutrients: Will start TPN at 50% of goal today. Higher goal protein to promote healing of surgical wounds. Currently intubated on low dose propofol, adjust lipids if needed.  2. Micronutrients/Electrolytes: TPN formulated to ~75% NS equivalence, CL:acetate ratio 25:75. Appears at risk of refeeding, will provided 20 mmol KPhos. Possibly add Zinc to TPN.  3. Glycemic Control: Hypoglycemia today, placed on D5. Question starvation. Etoh abuse history and appears malnourished.   4. Fluid Status: Will continue 100ml/hr fluid. UOP has been low today, receiving fluid boluses for hypovolemia. Will monitor fluid status closely.    Monitor closely for refeeding.    Baudilio Bloom, PharmD, BCPS

## 2018-11-08 NOTE — DIETARY
"Nutrition Support Assessment - TPN    Nutrition services:   Day 3 of admit.  79 yo female with admitting diagnosis: small bowel obstruction    Current problem list:  1. Ischemic necrosis of small bowel  2. Sepsis  3. Small bowel volvulus  4. Respiratory failure - on vent  5. History of severe COPD, home O2 dependent, breast cancer, ETOH, smoker    Assessment: - based on admit scale weight  Estimated Nutritional Needs: based on: height 5'2\", weight 43.092 kg, IBW 49.896 kg, BMI 17.38    Calculation/Equation MSJ x 1.2 = 1050  Calories/day: 1050 - 1200 kcals (24 - 27 kcals/kg)  Protein/day: 52 - 60 g (1.2 - 1.4 g/kg)    Evaluation:   1. Pt on vent in need of nutrition support - tube feedings started however further extensive bowel surgeries with inability to use GI tract and pt in need of TPN  2. Pt with low BMI however weight has increased 3 kg since previous admit in august 2017.  Current weight 47.9 kg is increased 4.8 kg since admit scale weight of 43.1 kg.  Pt is 13.5 liters fluid positive.  3. Prealbumin decreased secondary to stress response and inflammation.     Recommendations/Plan:  1. TPN per H to meet estimated nutritional needs   2. Enteral feeds or po diet when appropriate    "

## 2018-11-08 NOTE — NON-PROVIDER
Patient Summary: Ms. Campbell is a 78 year old female who is hospital day 4 after a distal small bowel resection with damage control closure performed yesterday. She is day 4 of Flagyl and Rocephin.    24 Hour Events:  The patient was hypotensive throughout the night with SBP in the 80s-90s and had a low urine output of 12.5 cc/hr. She was given 4 500 mL NS boluses throughout the night and her vitals have improved this morning with SBP in the 100s-120s. Her most recent hemoglobin this morning was 6.9. Overnight her blood sugar was also low with FBS of 61. She was given 100 mL of D50. Most current blood sugar was 102. She is currently NPO and had not had a BM. Her abthera has put out 350 mL overnight and NG tube has put out 900 mL overnight.    SUBJECTIVE/OBJECTIVE:  NEURO:  GCS  24hr: 15 on arrival to the ED   Current: 9T   Exam Moving all extremities to pain, alert, pupils equal and reactive to light.   Meds/Pain Propofol IV @ 5.2 ml/hr (STOPPED), Fentanyl IV @ 50 mcg/hr, Fentanyl IM 25 mcg q3hr prn, Toradol IV 15mg q6hr,     Labs None   Imaging None     RESP:  RR, SpO2 (12-22); 14, (%); 93% on ventilator   Vent PEEP 8, FiO2 100%, RR 22; On ASV   Exam Hyperresonant breath sound bilaterally, no wheezes, rales, or rhonchi   Meds Symbicort 160-4.5 MCG/ACT 2 puffs BID (held), DuoNeb 3 ml qhr prn, and Spiriva 18 mcg 1 inhalation daily   Labs CO2 19, Anion Gap 2   Imaging CXR 11/8 showed No significant interval change.     CV:  HR/BP/MAP/  CVP HR (); 78/BP (/50-74); 120/59   Exam Regular rate and rhythm, no murmurs, rubs, or gallops   Meds Hydrazaline 10 mg qhr prn if SBP > 160, Labetalol 10 mg IV if SBP > 160, Levophed 8 mg in  ml at rate of 46.9 mlk/hr (discontinued at 0400), Vasopressin 20 units in  ml at rate of 9 ml/hr (discontinued at 0400)   Labs RBC 2.33, H&H 6.9/22.8, MCV 97.9, Platelets 190   Imaging None     GI:  Diet/Bowel Function NPO; No BM today   Exam Abthera in place in central  abdomen, no signs of erythema or purulent drainage, output from Abthera 350 cc overnight   Labs WBC 6.3, K+ 4.1, Corrected Ca 9.5, Glucose 102, AST 20, ALT 8, Albumin 2, Total protein 3.7, Globulin 1.7, Lactic Acid 1.4   Imaging CT-A/P 11/4 showed a mechanical small bowel obstruction with a closed loop. No pneumatosis or pneumoperitoneum is appreciated. Small amount of ascites is suggestive of developing ischemia.     F/E/N-:  I/O  24hr totals: 250   Intake: NS at 100 cc/hr   Output: 12.5 cc/hr overnight                                     Exam Chambers catheter in place   Meds NS at 100 cc/hr   Labs Na+ 146, K+ 4.1, Cl 123, BUN 37, Cr 0.76   Nutrition NPO; Start TPN today (11/8)     HEME:  Labs RBC 2.33, H&H 6.9/22.8, MCV 97.9, Platelets 190   Transfusions None   Imaging None     ID:  Temp  24hr: 36.4-37.6   Tmax: 37.6  (99.7)   Labs WBC 6.3   Micro Preliminary blood cultures negative   ABX Rocephin 1 g IV daily, Flagyl 500 mg IV q8hr - DAY 4     ENDOCRINE:  Blood Sugar 101   Meds Humulin 1-6 units q6h     MUSCULOSKELETAL:  Imaging None   WB Status On ventilator, unable to ambulate     SKIN:  Exam Watm to the touch, dry, several areas of ecchymosis, diffusely edematous on all 4 extremities    Interventions Check for pressure wound q shift     ASSESSMENT/PLAN:  NEURO: Patient is alert with GCS of 9T. Continue neuro checks every shift. Patient with a history of alcohol abuse and will monitor for alcohol withdrawal.    RESP: Patient has a history of COPD. She is currently on the ventilator and stable. Can restart home medications of Spiriva, DuoNeb, Symbicort, and Combivent once extubated.     CV: Blood pressure was low overnight with systolic in the 80's-90's. Stop Vasopressin and Norepi given her stable blood pressure. If SBP gets above 160 can give patient Labetalol. Patient is hypovolemic. Continue fluid resuscitation.    GI: Patient had bowel resection and primary anastomosis of proximal small bowel obstruction  with volvulus by Dr. Yañez on 11/4/19, as well as a distal small bowel resection with primary anastomosis by Dr. De Guzman on 11/7/18. Abthera in place from Ex. Lap site with adequate drainage of 350 cc overnight. There appears to be no signs of infection from the wound site as there is no surrounding erythema or drainage. Continue to check dressing for signs of infection.     FEN-: Patient is currently not receiving tube feeds. Her K+ is normal at 4.1. Her Calcium is 7.9, with corrected calcium of 9.5. Her urine output was 12.5cc/hr last night. She required four 500 mL NS boluses. IV fluids running at  cc/hr. Repeat BMP in the morning to monitor electrolytes. Start patient on TPN this afternoon.    HEME: Patient continues to have drop in her hemoglobin. Current Hemoglobin is 6.9 (down from 7.7 yesterday). Transfuse 1 unit of PRBC's given that her hemoglobin is less than 7.    ID: Patient has been afebrile with a normal WBC count of 6.3. Resolving sepsis with ongoing resuscitation. Follow sepsis bundle -  Day 4 of antibiotics following source control. Continue IV Rocephin 1 g daily and IV Flagyl 500 mg IV q8hr. Repeat CBC and lactate q12 hours.     ENDOCRINE: Patient has blood sugar of 101. Continue to monitor.    MUSCULOSKELETAL: Continue DVT prophylaxis of Lovenox 40 mg IM daily.     SKIN: Continue daily skin checks to assess for pressure wounds. Place heating blanket on patient given that she is cool to the touch.    PROPHYLAXIS:  DVT  Lovenox 40 mg IM daily   GI  Dulocolax suppository 10 mg daily, Colace 100 mg po BID, Pepcid 20 mg IV BID, Milk of Magnesia 30 mL po daily, Miralax packet BID   Restraints  Present in bilateral upper extremities     LINES/TUBES/CATHETERS:  Radial Art line, Central line  Chambers catheter

## 2018-11-08 NOTE — PROGRESS NOTES
2 RN skin check performed. Small skin tear to left hand; weeping. Sacrum intact; mepilex in place. Midline wound vac visualized; clean, dry and intact. All appropriate LDAs open in flowsheets.

## 2018-11-08 NOTE — CARE PLAN
Problem: Ventilation Defect:  Goal: Ability to achieve and maintain unassisted ventilation or tolerate decreased levels of ventilator support  Adult Ventilation Update    Total Vent Days: 4    Patient Lines/Drains/Airways Status    Active Airway     Name: Placement date: Placement time: Site: Days:    Airway ETT Oral 8.0 11/05/18      Oral   3              In the last 24 hours, the patient tolerated SBT for 0 hours    #FVC / Vital Capacity (liters) : 0.55 (11/06/18 1642)  NIF (cm H2O) : -12 (11/06/18 1642)  Rapid Shallow Breathing Index (RR/VT): 42 (11/06/18 1642)  Plateau Pressure (Q Shift): 14 (11/07/18 1137)  Static Compliance (ml / cm H2O): 155 (11/08/18 0256)    Patient failed trials because of Barriers to Wean: Presence of Neuromuscular Blockade Agents (11/07/18 1533)  Barriers to SBT Weaning Trial Stopped due to:: Pt weaned for 1 hour and returned to rest settings per protocol (11/06/18 1642)  Length of Weaning Trial Length of Weaning Trial (Hours): 1 hr 45 mins (11/06/18 1642)      Cough: Non Productive (11/08/18 0400)  Sputum Amount: Scant (11/08/18 0400)  Sputum Color: Clear (11/08/18 0400)  Sputum Consistency: Frothy (11/08/18 0400)    Mobility  Level of Mobility: Level IV (11/06/18 0800)  Activity Performed: Unable to mobilize (11/08/18 0200)  Pt Calls for Assistance: No (11/06/18 2000)  Reason Not Mobilized: Unstable condition (11/08/18 0200)  Mobilization Comments: Pt desating when turned; hemodynamically unstable (BP drops with turns) (11/08/18 0200)    Events/Summary/Plan: Pt. arrived back from OR, still paralyzed at this time so no Spont trial and increased ASV support while on paralytic. (11/07/18 1533)

## 2018-11-08 NOTE — CARE PLAN
Problem: Ventilation Defect:  Goal: Ability to achieve and maintain unassisted ventilation or tolerate decreased levels of ventilator support  Outcome: PROGRESSING SLOWER THAN EXPECTED  Adult Ventilation Update    Total Vent Days: 4    Patient Lines/Drains/Airways Status    Active Airway     Name: Placement date: Placement time: Site: Days:    Airway ETT Oral 8.0 11/05/18      Oral   4              In the last 24 hours, the patient tolerated SBT for 2hrs. on settings of 5/8, 30%.      Events/Summary/Plan: Pt. On settings of ASV, 120, +8, 30%.

## 2018-11-08 NOTE — PROGRESS NOTES
"General Surgery    No acute changes  Stable on vent    /62   Pulse 80   Temp 37.5 °C (99.5 °F)   Resp 17   Ht 1.575 m (5' 2\")   Wt 47.9 kg (105 lb 9.6 oz)   SpO2 94%   BMI 19.31 kg/m²   Vac CDI  NGT and NJT in place    WBC normal  Hgb 11->8.8->7.7->6.9 today, no evidence of active bleeding    A/P)  11/5: ex lap, SBR, Abthera  11/7: exlap, SBR, abthera (by Dr. De Guzman)    Doing well  Return to OR tomorrow for exlap and possible closure. Tentatively 1330  Appreciate ICU team's help managing Ms. Campbell.    Christopher Yañez MD  [facility] (General and Vascular Surgery)  Cell: [phone] (text or call is fine, if you don't reach me please try my office)  Office: [phone]  __________________________________________________________________  Patient:Leanna Campbell   MRN:[id number]   CSN:[id number]    "

## 2018-11-08 NOTE — CARE PLAN
Problem: Ventilation Defect:  Goal: Ability to achieve and maintain unassisted ventilation or tolerate decreased levels of ventilator support  Outcome: PROGRESSING SLOWER THAN EXPECTED  Adult Ventilation Update    Total Vent Days: 3    Patient Lines/Drains/Airways Status    Active Airway     Name: Placement date: Placement time: Site: Days:    Airway ETT Oral 8.0 11/05/18      Oral   3              In the last 24 hours, the patient tolerated SBT for 1hr on settings of 5/8, 30%.      Events/Summary/Plan: Pt. Post OR is on ASV, 140%, +8, 40%.  Afternoon wean not done since pt. Still has residual paralytics in play.

## 2018-11-08 NOTE — PROGRESS NOTES
Trauma / Surgical Daily Progress Note    Date of Service  11/8/2018    Chief Complaint  78 y.o. female admitted 11/4/2018 with Bowel obstruction (HCC)    Interval Events  Second look laparotomy.  Extensive bowel resection.  Ongoing hypovolemic resuscitation.  TPN started.  Ongoing broad-spectrum antibiotic therapy.    Review of Systems  Review of Systems   Unable to perform ROS: Acuity of condition        Vital Signs for last 24 hours  Pulse:  [] 87  Resp:  [12-22] 16    Hemodynamic parameters for last 24 hours  CVP:  [-317.4 MM HG-327 MM HG] 326 MM HG    Respiratory Data     Respiration: 16, Pulse Oximetry: 93 %     Work Of Breathing / Effort: Vented  RUL Breath Sounds: Rhonchi, RML Breath Sounds: Diminished, RLL Breath Sounds: Diminished, ROSA Breath Sounds: Rhonchi, LLL Breath Sounds: Diminished    Physical Exam  Physical Exam   Constitutional: She appears cachectic. She is easily aroused. She appears ill. She is sedated and intubated.   HENT:   Head: Normocephalic and atraumatic.   Eyes: Pupils are equal, round, and reactive to light. Conjunctivae and EOM are normal.   Neck: Normal range of motion. Neck supple. No tracheal deviation present.   Cardiovascular: Normal rate, regular rhythm and intact distal pulses.    Pulmonary/Chest: She is intubated. No respiratory distress.   Hyperresonant breath sounds bilaterally  Stigmata of chronic COPD   Abdominal: Soft. There is tenderness.   AbThera dressing intact   Genitourinary:   Genitourinary Comments: Mildly concentrated clear yellow urine   Musculoskeletal: Normal range of motion. She exhibits no edema or deformity.   Neurological: She is easily aroused. She exhibits normal muscle tone. Coordination normal.   Skin: Skin is warm and dry.   Psychiatric:   Unable to assess currently   Nursing note and vitals reviewed.      Laboratory  Recent Results (from the past 24 hour(s))   ACCU-CHEK GLUCOSE    Collection Time: 11/07/18 12:12 PM   Result Value Ref Range     Glucose - Accu-Ck 91 65 - 99 mg/dL   HISTOLOGY REQUEST    Collection Time: 11/07/18  4:14 PM   Result Value Ref Range    Pathology Request Sent to [facility]    ACCU-CHEK GLUCOSE    Collection Time: 11/07/18  6:02 PM   Result Value Ref Range    Glucose - Accu-Ck 69 65 - 99 mg/dL   ACCU-CHEK GLUCOSE    Collection Time: 11/07/18  6:55 PM   Result Value Ref Range    Glucose - Accu-Ck 98 65 - 99 mg/dL   ACCU-CHEK GLUCOSE    Collection Time: 11/07/18  8:29 PM   Result Value Ref Range    Glucose - Accu-Ck 85 65 - 99 mg/dL   ACCU-CHEK GLUCOSE    Collection Time: 11/07/18 11:31 PM   Result Value Ref Range    Glucose - Accu-Ck 61 (L) 65 - 99 mg/dL   ACCU-CHEK GLUCOSE    Collection Time: 11/08/18 12:00 AM   Result Value Ref Range    Glucose - Accu-Ck 84 65 - 99 mg/dL   ACCU-CHEK GLUCOSE    Collection Time: 11/08/18 12:30 AM   Result Value Ref Range    Glucose - Accu-Ck 134 (H) 65 - 99 mg/dL   ACCU-CHEK GLUCOSE    Collection Time: 11/08/18  1:35 AM   Result Value Ref Range    Glucose - Accu-Ck 92 65 - 99 mg/dL   CBC WITH DIFFERENTIAL    Collection Time: 11/08/18  4:10 AM   Result Value Ref Range    WBC 6.3 4.8 - 10.8 K/uL    RBC 2.33 (L) 4.20 - 5.40 M/uL    Hemoglobin 6.9 (L) 12.0 - 16.0 g/dL    Hematocrit 22.8 (L) 37.0 - 47.0 %    MCV 97.9 (H) 81.4 - 97.8 fL    MCH 29.6 27.0 - 33.0 pg    MCHC 30.3 (L) 33.6 - 35.0 g/dL    RDW 54.4 (H) 35.9 - 50.0 fL    Platelet Count 190 164 - 446 K/uL    MPV 10.5 9.0 - 12.9 fL    Nucleated RBC 0.60 /100 WBC    NRBC (Absolute) 0.04 K/uL    Neutrophils-Polys 70.30 44.00 - 72.00 %    Lymphocytes 19.80 (L) 22.00 - 41.00 %    Monocytes 9.90 0.00 - 13.40 %    Eosinophils 0.00 0.00 - 6.90 %    Basophils 0.00 0.00 - 1.80 %    Neutrophils (Absolute) 4.43 2.00 - 7.15 K/uL    Lymphs (Absolute) 1.25 1.00 - 4.80 K/uL    Monos (Absolute) 0.62 0.00 - 0.85 K/uL    Eos (Absolute) 0.00 0.00 - 0.51 K/uL    Baso (Absolute) 0.00 0.00 - 0.12 K/uL    Anisocytosis 1+    COMP METABOLIC PANEL    Collection Time: 11/08/18   4:10 AM   Result Value Ref Range    Sodium 146 (H) 135 - 145 mmol/L    Potassium 4.1 3.6 - 5.5 mmol/L    Chloride 123 (H) 96 - 112 mmol/L    Co2 19 (L) 20 - 33 mmol/L    Anion Gap 4.0 0.0 - 11.9    Glucose 77 65 - 99 mg/dL    Bun 37 (H) 8 - 22 mg/dL    Creatinine 0.76 0.50 - 1.40 mg/dL    Calcium 7.9 (L) 8.5 - 10.5 mg/dL    AST(SGOT) 20 12 - 45 U/L    ALT(SGPT) 8 2 - 50 U/L    Alkaline Phosphatase 23 (L) 30 - 99 U/L    Total Bilirubin 0.2 0.1 - 1.5 mg/dL    Albumin 2.0 (L) 3.2 - 4.9 g/dL    Total Protein 3.7 (L) 6.0 - 8.2 g/dL    Globulin 1.7 (L) 1.9 - 3.5 g/dL    A-G Ratio 1.2 g/dL   LACTIC ACID    Collection Time: 11/08/18  4:10 AM   Result Value Ref Range    Lactic Acid 1.4 0.5 - 2.0 mmol/L   MAGNESIUM    Collection Time: 11/08/18  4:10 AM   Result Value Ref Range    Magnesium 2.5 1.5 - 2.5 mg/dL   PHOSPHORUS    Collection Time: 11/08/18  4:10 AM   Result Value Ref Range    Phosphorus 2.5 2.5 - 4.5 mg/dL   ESTIMATED GFR    Collection Time: 11/08/18  4:10 AM   Result Value Ref Range    GFR If African American >60 >60 mL/min/1.73 m 2    GFR If Non African American >60 >60 mL/min/1.73 m 2   DIFFERENTIAL MANUAL    Collection Time: 11/08/18  4:10 AM   Result Value Ref Range    Manual Diff Status PERFORMED    PERIPHERAL SMEAR REVIEW    Collection Time: 11/08/18  4:10 AM   Result Value Ref Range    Peripheral Smear Review see below    PLATELET ESTIMATE    Collection Time: 11/08/18  4:10 AM   Result Value Ref Range    Plt Estimation Normal    MORPHOLOGY    Collection Time: 11/08/18  4:10 AM   Result Value Ref Range    RBC Morphology Present     Giant Platelets 3+     Poikilocytosis 3+     Ovalocytes 1+     Schistocytes 1+     Echinocytes 1+     Smudge Cells Moderate    COD - Adult (Type and Screen)    Collection Time: 11/08/18  4:10 AM   Result Value Ref Range    ABO Grouping Only O     Rh Grouping Only POS     Antibody Screen-Cod NEG    ACCU-CHEK GLUCOSE    Collection Time: 11/08/18  6:20 AM   Result Value Ref Range     Glucose - Accu-Ck 53 (L) 65 - 99 mg/dL   ACCU-CHEK GLUCOSE    Collection Time: 11/08/18  6:52 AM   Result Value Ref Range    Glucose - Accu-Ck 70 65 - 99 mg/dL   ACCU-CHEK GLUCOSE    Collection Time: 11/08/18  7:40 AM   Result Value Ref Range    Glucose - Accu-Ck 102 (H) 65 - 99 mg/dL       Fluids    Intake/Output Summary (Last 24 hours) at 11/08/18 1044  Last data filed at 11/08/18 0800   Gross per 24 hour   Intake          3980.92 ml   Output             2005 ml   Net          1975.92 ml       Core Measures & Quality Metrics  Labs reviewed, Medications reviewed and Radiology images reviewed  Chambers catheter: Critically Ill - Requiring Accurate Measurement of Urinary Output  Central line in place: Need for access, Sepsis and Vasopressors    DVT Prophylaxis: Heparin  DVT prophylaxis - mechanical: SCDs  Ulcer prophylaxis: Yes  Antibiotics: Treating active infection/contamination beyond 24 hours perioperative coverage      TMI Score  ETOH Screening    Assessment/Plan  Ischemic necrosis of small bowel (HCC)   Assessment & Plan    11/8 second look laparotomy demonstrated multifocal patchy necrosis of the distal jejunum and proximal ileum.  Segmental bowel resection with this.  Planned interval laparotomy to assess bowel viability SMA as source of emboli.     Anemia associated with acute blood loss   Assessment & Plan    Progressive critical anemia.  11/8 transfused 1 unit of packed red blood cells.  Continue to trend closely.  Transfuse 1 unit PRBC's for hemoglobin less than 7.     Sepsis following intra-abdominal surgery   Assessment & Plan    Resolving septic shock secondary to small bowel volvulus and enteric contamination.  11/5 exploratory laparotomy with bowel resection and damage control closure.  Sepsis bundle implemented. Trending endpoints of resuscitation.  11/8 Antibiotic day 4.     Small bowel volvulus (HCC)   Assessment & Plan    Acute volvulus of the proximal jejunum with closed loop obstruction and  necrotic intervening bowel.  11/5 Exploratory laparotomy with bowel resection and primary anastomosis.  Damage control closure for enteric contamination.  11/5 Ceftriaxone and metronidazole day #1 following definitive source control.  Plan second look laparotomy with ABThera dressing change versus delayed primary fascial closure.  Christopher Yañez MD.  General and Vascular Surgery.     Acute respiratory failure following trauma and surgery ([facility])- (present on admission)   Assessment & Plan    Ventilatory support following emergent abdominal surgery.  Continue full mechanical ventilatory support. Ventilator bundle and Trauma weaning protocol.     Alcohol abuse- (present on admission)   Assessment & Plan    Habitual daily alcohol use.  Alcohol withdrawal surveillance.     COPD (chronic obstructive pulmonary disease) ([facility])- (present on admission)   Assessment & Plan    Oxygen dependent COPD.    Chronic condition treated with Spiriva, DuoNeb, Symbicort, and Combivent.  Resume maintenance medications following extubation.         Discussed patient condition with Family, RN, RT, Pharmacy and vascular surgery.  The patient remains critically ill with multiorgan failure.  High risk for acute and sudden deterioration and worsening vital organ function.   I provided the following critical care services: Ventilator management, sepsis resuscitation, direction of antibiotic therapy, and coordination of multidisciplinary critical care.    CRITICAL CARE TIME EXCLUDING PROCEDURES: 40 minutes

## 2018-11-08 NOTE — CARE PLAN
Problem: Venous Thromboembolism (VTW)/Deep Vein Thrombosis (DVT) Prevention:  Goal: Patient will participate in Venous Thrombosis (VTE)/Deep Vein Thrombosis (DVT)Prevention Measures    Intervention: Ensure patient wears graduated elastic stockings (LOS hose) and/or SCDs, if ordered, when in bed or chair (Remove at least once per shift for skin check)  Pt wearing SCDs. SCDs applied appropriately and repositioned q 2hr. SCD machine turned on. Skin check performed under SCDs.      Problem: Skin Integrity  Goal: Risk for impaired skin integrity will decrease    Intervention: Implement precautions to protect skin integrity in collaboration with the interdisciplinary team  Implementation of precautions to protect skin such as turning patient q 2hrs and using pillows to help support repositioning. Heel float boots implemented to protect bony prominences of heels from skin breakdown. ET tube repositioned q 2 hr to prevent skin breakdown. Barrier wipes and moisturizer in use to help prevent breakdown. 2 RN skin check.

## 2018-11-08 NOTE — PROGRESS NOTES
Dr. De Guzman updated on pt's low urine output 25mL/2hr. Dr. De Guzman ordered 500mL NS bolus for urine output <50mL/2hr. Repeat bolus up to 4x. Order read back to Dr. De Guzman. Dr. De Guzman updated on pt's systolic BP in 90s. Dr. De Guzman ordered to monitor CVP and administer bolus for low urine output. Dr. De Guzman advised if needed to utilize Norepinephrine in MAR. Order repeated back to Dr. De Guzman. Dr. De Guzman okayed giving PO medications through [facility]. Order read back to Dr. De Guzman.

## 2018-11-08 NOTE — CARE PLAN
Problem: Skin Integrity  Goal: Risk for impaired skin integrity will decrease  Outcome: PROGRESSING AS EXPECTED  Skin will be assessed frequently for breakdown and pt will remain clean, dry, and intact. All listed wounds will be assessed.      Problem: Pain Management  Goal: Pain level will decrease to patient's comfort goal  Outcome: PROGRESSING AS EXPECTED  Pt's pain will decrease to comfort goal through rest, repositioning, a quiet environment, and PRN pharmacologic analgesia.

## 2018-11-08 NOTE — PROGRESS NOTES
Paola Charge RN updated on patient's status of FSBS 61, administration of 25mL D50 per order. Re-checked blood sugar 15 minutes later; fsbs 84. Administered another 25mL D50. Re-check fsbs 15 minutes later. FSBS 134. Paola advised to re-check fsbs in one hour

## 2018-11-08 NOTE — ASSESSMENT & PLAN NOTE
Progressive critical anemia.  11/8 transfused 1 unit of packed red blood cells.  Continue to trend closely.  Transfuse 1 unit PRBC's for hemoglobin less than 7.

## 2018-11-09 NOTE — DOCUMENTATION QUERY
DOCUMENTATION QUERY    PROVIDERS: Please select “Cosign w/ note”to reply to query.    To better represent the severity of illness of your patient, please review the following information and exercise your independent professional judgment in responding to this query.     4L O2 @ home is documented in the ER RN triage Note. Based upon the clinical findings, risk factors, and treatment, can a diagnosis be provided to support this finding?    • Chronic Respiratory Failure  • Chronic Hypoxemia  • Other explanation of clinical findings  • Unable to determine    The medical record reflects the following:   Clinical Findings ER RN triage note:  -Wears 4L O2 @ home  Surg consult:  -Severe COPD on oxygen at home  -ICU postop- likely remain intubated   Treatment RT/O2 protocol  Combivent  DuoNeb  Symbicort  Spiriva   Risk Factors Smoker  COPD  Alcohol dependence  Severe malnutrition  Cachexia   Location within medical record Progress Notes     Thank you,   Alber Nelson RN BSN  Clinical   [phone] [facility] office  [phone] Cell phone

## 2018-11-09 NOTE — PROGRESS NOTES
2 RN skin check complete. Sacrum intact, mepilex in place. Bilateral elbows red and blanching, skin tear on dorsal side of left hand.

## 2018-11-09 NOTE — OR NURSING
Patient arrived in preop, vented with ICU RN and RT. Report given to anesthesiologist. Consents signed by daughter, WHO checklist completed with OR RN. Patient went straight to OR.

## 2018-11-09 NOTE — DOCUMENTATION QUERY
DOCUMENTATION QUERY    PROVIDERS: Please select “Cosign w/ note”to reply to query.    To better represent the severity of illness of your patient, please review the following information and exercise your independent professional judgment in responding to this query.     Elevated BUN, decreased GFR, decreased urine output in the presence of hypovolemia and septic shock are noted in the Progress Notes and Lab Results . Based upon the clinical findings, risk factors, and treatment, can a diagnosis be provided to support this finding?    • Acute renal failure.  • Acute kidney injury  • Chronic kidney disease, (if so, please provide staging).  • Acute on chronic kidney disease, (if so, please provide staging)  • Other explanation of clinical findings  • Unable to determine (no explanation for clinical findings)      The medical record reflects the following:   Clinical Findings ER MD note:  -N/V x1 week  Lab results:  -11/4: Bun 14, Creatinine 0.98, GFR 55  -11/5 @ 0445: Bun 16, Creatinine 0.81, GFR >60  -11/5 @ 1835: Bun 25, Creatinine 1.07, GFR 50  -11/6: Bun 26, Creatinine 1.06, GFR 50  Hypovolemic - Septic shock  Hyperkalemia  11/5 RN Note:  -MD at bedside to insert casey after multiple attempts by RN  11/5 Surg Note:  -Decreased urine output of 10cc/hr   Treatment IVF bolus and infusion  Hypovolemia resuscitation  Casey catheter  Monitor labs  500 NS bolus for U/O <50  TPN   Risk Factors CT scan with contrast  Hypovolemic  Sepsis  Cachexia  Severe protein malnutrition  Smoker  Alcohol dependence  Bowel obstruction S/P resection  Acute blood loss anemia   Location within medical record History and Physical, Progress Notes and Lab Results      Thank you,   Alber Nelson RN BSN  Clinical   [phone] [facility] office  [phone] Cell phone      Definition of SHERITA/ARF:  is an abrupt reduction in kidney function with increase in SCr of >/= 0.3 mg/dl   or SCr > 150% from baseline   or oliguria of  < 0.5 ml/kg per hour for more than 6 hours.      Definitions of CKD stages & ESRD are documented in chart below.     CKD Stage    Description    GFR (mL/min/1.73 m2)      1    Kidney damage with normal or elevated GFR    Greater than 90      2    Kidney damage with mild decrease in GFR    60-89      3    Moderate decrease in GFR                       30-59      4    Severe decrease in GFR    15-29      5    Kidney failure  Less than 15  (or dialysis)      ESRD    Renal replacement therapy    Dialysis or transplantation   Source: [employer]

## 2018-11-09 NOTE — PROGRESS NOTES
Trauma / Surgical Daily Progress Note    Date of Service  11/9/2018    Chief Complaint  78 y.o. female admitted 11/4/2018 with Bowel obstruction (HCC)    Interval Events  OPen abdomen  Post bowel resection and reexplore today  Marginal bp  Fluid bolus for low urine output HCT 25  Rocepin flagyl  Pre renal numbers    ChesT CT:  No PE  Echo: pulmonary hypertension and RV dysfunction  Right paresis :  CT head:  New basal ganglia infarct    Review of Systems  Review of Systems     Vital Signs for last 24 hours  Temp:  [36.3 °C (97.3 °F)-36.7 °C (98.1 °F)] 36.4 °C (97.5 °F)  Pulse:  [60-77] 77  Resp:  [12-20] 20    Hemodynamic parameters for last 24 hours       Respiratory Data     Respiration: 20, Pulse Oximetry: 95 %     Work Of Breathing / Effort: Vented  RUL Breath Sounds: Rhonchi, RML Breath Sounds: Diminished, RLL Breath Sounds: Diminished, ROSA Breath Sounds: Rhonchi, LLL Breath Sounds: Diminished    Physical Exam  Physical Exam    Laboratory  Recent Results (from the past 24 hour(s))   ACCU-CHEK GLUCOSE    Collection Time: 11/08/18  5:37 PM   Result Value Ref Range    Glucose - Accu-Ck 104 (H) 65 - 99 mg/dL   CBC WITH DIFFERENTIAL    Collection Time: 11/09/18  4:32 AM   Result Value Ref Range    WBC 7.3 4.8 - 10.8 K/uL    RBC 2.72 (L) 4.20 - 5.40 M/uL    Hemoglobin 8.1 (L) 12.0 - 16.0 g/dL    Hematocrit 25.6 (L) 37.0 - 47.0 %    MCV 93.0 81.4 - 97.8 fL    MCH 30.9 27.0 - 33.0 pg    MCHC 33.2 (L) 33.6 - 35.0 g/dL    RDW 55.2 (H) 35.9 - 50.0 fL    Platelet Count 201 164 - 446 K/uL    MPV 10.1 9.0 - 12.9 fL    Nucleated RBC 1.20 /100 WBC    NRBC (Absolute) 0.09 K/uL    Neutrophils-Polys 80.50 (H) 44.00 - 72.00 %    Lymphocytes 11.50 (L) 22.00 - 41.00 %    Monocytes 7.10 0.00 - 13.40 %    Eosinophils 0.90 0.00 - 6.90 %    Basophils 0.00 0.00 - 1.80 %    Neutrophils (Absolute) 5.88 2.00 - 7.15 K/uL    Lymphs (Absolute) 0.84 (L) 1.00 - 4.80 K/uL    Monos (Absolute) 0.52 0.00 - 0.85 K/uL    Eos (Absolute) 0.07 0.00 -  0.51 K/uL    Baso (Absolute) 0.00 0.00 - 0.12 K/uL    Anisocytosis 1+     Microcytosis 1+    COMP METABOLIC PANEL    Collection Time: 11/09/18  4:32 AM   Result Value Ref Range    Sodium 148 (H) 135 - 145 mmol/L    Potassium 3.8 3.6 - 5.5 mmol/L    Chloride 122 (H) 96 - 112 mmol/L    Co2 20 20 - 33 mmol/L    Anion Gap 6.0 0.0 - 11.9    Glucose 96 65 - 99 mg/dL    Bun 34 (H) 8 - 22 mg/dL    Creatinine 0.65 0.50 - 1.40 mg/dL    Calcium 7.6 (L) 8.5 - 10.5 mg/dL    AST(SGOT) 13 12 - 45 U/L    ALT(SGPT) 7 2 - 50 U/L    Alkaline Phosphatase 38 30 - 99 U/L    Total Bilirubin 0.3 0.1 - 1.5 mg/dL    Albumin 1.9 (L) 3.2 - 4.9 g/dL    Total Protein 3.7 (L) 6.0 - 8.2 g/dL    Globulin 1.8 (L) 1.9 - 3.5 g/dL    A-G Ratio 1.1 g/dL   LACTIC ACID    Collection Time: 11/09/18  4:32 AM   Result Value Ref Range    Lactic Acid 1.1 0.5 - 2.0 mmol/L   MAGNESIUM    Collection Time: 11/09/18  4:32 AM   Result Value Ref Range    Magnesium 2.3 1.5 - 2.5 mg/dL   PHOSPHORUS    Collection Time: 11/09/18  4:32 AM   Result Value Ref Range    Phosphorus 2.5 2.5 - 4.5 mg/dL   ESTIMATED GFR    Collection Time: 11/09/18  4:32 AM   Result Value Ref Range    GFR If African American >60 >60 mL/min/1.73 m 2    GFR If Non African American >60 >60 mL/min/1.73 m 2   DIFFERENTIAL MANUAL    Collection Time: 11/09/18  4:32 AM   Result Value Ref Range    Manual Diff Status PERFORMED    PERIPHERAL SMEAR REVIEW    Collection Time: 11/09/18  4:32 AM   Result Value Ref Range    Peripheral Smear Review see below    PLATELET ESTIMATE    Collection Time: 11/09/18  4:32 AM   Result Value Ref Range    Plt Estimation Normal    MORPHOLOGY    Collection Time: 11/09/18  4:32 AM   Result Value Ref Range    RBC Morphology Present     Polychromia 1+     Poikilocytosis 3+     Echinocytes 3+     Smudge Cells Moderate     Toxic Gran Slight    ACCU-CHEK GLUCOSE    Collection Time: 11/09/18  6:29 AM   Result Value Ref Range    Glucose - Accu-Ck 82 65 - 99 mg/dL   ACCU-CHEK GLUCOSE     Collection Time: 11/09/18 12:05 PM   Result Value Ref Range    Glucose - Accu-Ck 99 65 - 99 mg/dL   EC-ECHOCARDIOGRAM COMPLETE W/O CONT    Collection Time: 11/09/18 12:26 PM   Result Value Ref Range    Left Ventrical Ejection Fraction 75        Fluids    Intake/Output Summary (Last 24 hours) at 11/09/18 1511  Last data filed at 11/09/18 1200   Gross per 24 hour   Intake          3520.46 ml   Output             2640 ml   Net           880.46 ml       Core Measures & Quality Metrics  Core Measures & Quality Metrics  TIM Score  ETOH Screening    Assessment/Plan  Ischemic necrosis of small bowel (HCC)   Assessment & Plan    11/8 second look laparotomy demonstrated multifocal patchy necrosis of the distal jejunum and proximal ileum.  Segmental bowel resection with this.  11/9 :  Planned interval laparotomy to assess bowel viability SMA as source of emboli.     Anemia associated with acute blood loss   Assessment & Plan    Progressive critical anemia.  11/8 transfused 1 unit of packed red blood cells.  Continue to trend closely.  Transfuse 1 unit PRBC's for hemoglobin less than 7.     Sepsis following intra-abdominal surgery   Assessment & Plan    Resolving septic shock secondary to small bowel volvulus and enteric contamination.  11/5 exploratory laparotomy with bowel resection and damage control closure.  Sepsis bundle implemented. Trending endpoints of resuscitation.  11/8 Antibiotic day 4.     Small bowel volvulus (HCC)   Assessment & Plan    Acute volvulus of the proximal jejunum with closed loop obstruction and necrotic intervening bowel.  11/5 Exploratory laparotomy with bowel resection and primary anastomosis.  Damage control closure for enteric contamination.  11/5 Ceftriaxone and metronidazole day #1 following definitive source control.  Plan second look laparotomy with ABThera dressing change versus delayed primary fascial closure.  Christopher Yañez MD.  General and Vascular Surgery.     Acute respiratory  failure following trauma and surgery (HCC)- (present on admission)   Assessment & Plan    Ventilatory support following emergent abdominal surgery.  Continue full mechanical ventilatory support. Ventilator bundle and Trauma weaning protocol.  Oxygen dependent  COPD as a baseline.       Alcohol abuse- (present on admission)   Assessment & Plan    Habitual daily alcohol use.  Alcohol withdrawal surveillance.     COPD (chronic obstructive pulmonary disease) (HCC)- (present on admission)   Assessment & Plan    Oxygen dependent COPD.    Chronic condition treated with Spiriva, DuoNeb, Symbicort, and Combivent.  Resume maintenance medications following extubation.         Discussed patient condition with RN, RT and Pharmacy.  CRITICAL CARE TIME EXCLUDING PROCEDURES: 120  Minutes  Managing hypotension, multiple fluid bolus, review of stat imaging, consultation with cardiology and vascular surgery, family conference.

## 2018-11-09 NOTE — CARE PLAN
Adult Ventilation Update    Total Vent Days: 5    Patient Lines/Drains/Airways Status    Active Airway     Name: Placement date: Placement time: Site: Days:    Airway ETT Oral 8.0 11/05/18      Oral   4              In the last 24 hours, the patient tolerated SBT for 2 hours on settings of 5/8.    #FVC / Vital Capacity (liters) : 0.5 (not following.) (11/08/18 1553)  NIF (cm H2O) : -18 (11/08/18 1553)  Rapid Shallow Breathing Index (RR/VT): 33 (11/08/18 1553)  Plateau Pressure (Q Shift): 16 (11/08/18 1048)  Static Compliance (ml / cm H2O): 80 (11/09/18 0156)    Patient failed trials because of Barriers to Wean: Presence of Neuromuscular Blockade Agents (11/07/18 1533)  Barriers to SBT Weaning Trial Stopped due to:: Pt weaned for 1 hour and returned to rest settings per protocol (11/08/18 1553)  Length of Weaning Trial Length of Weaning Trial (Hours): 1 (11/08/18 1553)        (ASV) % MIN VOL: 120 (11/09/18 0156)  ASV Inspiratory Pressures  % Spontaneous 0-100    Sputum/Suction   Cough: Weak;Productive (11/09/18 0400)  Sputum Amount: Scant (11/09/18 0400)  Sputum Color: White;Clear (11/09/18 0400)  Sputum Consistency: Thick;Thin (11/09/18 0400)    Mobility  Level of Mobility: Level III (11/09/18 0200)  Activity Performed: Edge of bed (11/09/18 0200)  Time Activity Tolerated: 10 min (11/09/18 0200)  Distance Per Occurrence (ft.): 1 feet (11/09/18 0200)  # of Times Distance was Traveled: 1 (11/09/18 0200)  Assistance / Tolerance: Assistance of Two or More;Tolerates Well (11/09/18 0200)  Pt Calls for Assistance: No (11/09/18 0200)  Staff Present for Mobilization: RN (2 RN) (11/09/18 0200)  Gait: Unable to Ambulate (11/09/18 0200)  Reason Not Mobilized: Unstable condition (11/08/18 1048)  Mobilization Comments: Pt desating when turned; hemodynamically unstable (BP drops with turns) (11/08/18 0200)    Events/Summary/Plan: Spont. wean ended at this time.  Pt. placed on previous settings. (11/08/18 6445)

## 2018-11-09 NOTE — CARE PLAN
Problem: Respiratory:  Goal: Respiratory status will improve    Intervention: Assess and monitor pulmonary status  Assess respiratory status throughout shift. Collaborate with RT throughout shift. Assess lung sounds q 4 hours.       Problem: Skin Integrity  Goal: Risk for impaired skin integrity will decrease    Intervention: Assess risk factors for impaired skin integrity and/or pressure ulcers  Reposition patient throughout shift. Complete 2 RN skin assessment. Ensure dry flows are in place where appropriate. Collaborate with day RN regarding skin integrity.

## 2018-11-09 NOTE — PROGRESS NOTES
Pharmacy TPN Day # 2       2018    Dosing Weight   48 kg   (was hypovolemic on admission, actual body weight used on day 2 after volume resuscitation)            TPN currently providing 50% of goal                                                  TPN goal: 25 kcal/day including 1.5-2 gm/kg/day Protein                                                  TPN indication: Bowel resection    Pertinent PMH:  Exploratory laparotomy for acute small bowel volvulus. Extensive bowel resection, planned delayed abdominal closure. Alcohol abuse history.    Temp (24hrs), Av.4 °C (97.6 °F), Min:36.3 °C (97.3 °F), Max:36.7 °C (98.1 °F)  .  Recent Labs      18   0544  18   0410  18   0432   SODIUM  143  146*  148*   POTASSIUM  4.7  4.1  3.8   CHLORIDE  122*  123*  122*   CO2  19*  19*  20   BUN  36*  37*  34*   CREATININE  0.76  0.76  0.65   GLUCOSE  96  77  96   CALCIUM  8.1*  7.9*  7.6*   ASTSGOT  37  20  13   ALTSGPT  11  8  7   ALBUMIN  2.4*  2.0*  1.9*   TBILIRUBIN  0.3  0.2  0.3   PHOSPHORUS  3.1  2.5  2.5   MAGNESIUM  2.6*  2.5  2.3     Accu-Checks  Recent Labs      18   1737  18   0629  18   1205   POCGLUCOSE  104*  82  99       Vitals:    18 1115 18 1130 18 1145 18 0400   BP: 123/57 123/57 125/62    Weight:    51.1 kg (112 lb 10.5 oz)   Height:           Intake/Output Summary (Last 24 hours) at 18 1311  Last data filed at 18 1200   Gross per 24 hour   Intake          3720.46 ml   Output             2690 ml   Net          1030.46 ml       TPN for past 72 hours (Show up to 3 orders; newest on the left. Changes between the two most recent orders are indicated.)     Start date and time   2018      TPN Central Line Formulation [[id number]] TPN Central Line Formulation [[id number]]    Order Status  Active Last Dose in Progress    Last Given   20187       Base    Clinisol 15%  90 g 40 g    dextrose 70%  180 g 95 g    fat  emulsions 20%  25 g 12 g       Additives    potassium phosphates  20 mmol 20 mmol    potassium acetate  10 mEq --    sodium acetate  210 mEq 210 mEq    sodium chloride  70 mEq 70 mEq    magnesium sulfate  4 mEq --    calcium GLUConate  4.65 mEq 4.65 mEq    zinc sulfate  5 mg --    M.T.E. -5 Adult  1 mL 1 mL    M.V.I. ADULT  10 mL 10 mL       Energy Contribution    Proteins  -- --    Dextrose  -- --    Lipids  -- --    Total  0 kcal 0 kcal       Electrolyte Ion Calculated Amount    Sodium  -- --    Potassium  -- --    Calcium  -- --    Magnesium  -- --    Aluminum  -- --    Phosphate  -- --    Chloride  -- --    Acetate  -- --       Other    Total Protein  -- --    Total Protein/kg  -- --    Glucose Infusion Rate  -- --    Osmolarity  -- --    Volume  2,400 mL 2,400 mL    Rate  100 mL/hr 100 mL/hr    Dosing Weight  51.1 kg 47.9 kg    Infusion Site  [facility]            This formula provides:  % kcal as lipids = 18.5  Grams protein/kg = 1.9  Non-protein calories = 862  Kcals/kg = 25.2  Total daily calories = 1222    A/P:  1. Macronutrients: Will advance TPN to goal tonight. Higher goal protein to promote healing of surgical wounds. Currently intubated on very low dose propofol, adjust lipids if needed.  2. Micronutrients/Electrolytes: Hypernatremia. TPN formulated to ~75% NS equivalence, CL:acetate ratio 25:75. Have not seen full effect of TPN on todays labs. Will leave Na same for now. Added Zinc and mag to TPN. Increased K by 10 meq.  3. Glycemic Control: Glucose <180. Monitor as TPN advances.  4. Fluid Status: Will continue 100ml/hr fluid in TPN. Continues to be hypovolemic. LR ordered in addition to TPN for total of 150 ml/hr. Will monitor fluid status closely. Losses from wound VAC/NG.  5. Other: Monitor closely for refeeding. Etoh abuse history and appears malnourished. Add Famotidine to TPN tomorrow. Per surgery note, return to OR today for exlap and possible closure.    Baudilio Bloom, PharmD, BCPS

## 2018-11-09 NOTE — PROGRESS NOTES
2 RN skin check complete. Sacrum intact and mepilex in place. Skin tear on left hand unchanged. No changes noted in skin condition.

## 2018-11-09 NOTE — PROGRESS NOTES
"General Surgery    No acute changes  Stable on vent    /62   Pulse 77   Temp 36.4 °C (97.5 °F)   Resp 20   Ht 1.575 m (5' 2\")   Wt 51.1 kg (112 lb 10.5 oz)   SpO2 91%   BMI 20.60 kg/m²   Vac CDI  NGT and NJT in place    WBC normal  Hgb 11->8.8->7.7->6.9->8.1 today, no evidence of active bleeding    A/P)  11/5: ex lap, SBR, Abthera  11/7: exlap, SBR, abthera (by Dr. De Guzman)  11/9: Planning exlap today    Doing well  Return to OR today for exlap and possible closure.  Appreciate ICU team's help managing Ms. Campbell.    Christopher Yañez MD  [facility] (General and Vascular Surgery)  Cell: [phone] (text or call is fine, if you don't reach me please try my office)  Office: [phone]  __________________________________________________________________  Patient:Leanna Campbell   MRN:[id number]   CSN:[id number]    "

## 2018-11-10 LAB
BACTERIA BLD CULT: NORMAL
BACTERIA BLD CULT: NORMAL
SIGNIFICANT IND 70042: NORMAL
SIGNIFICANT IND 70042: NORMAL
SITE SITE: NORMAL
SITE SITE: NORMAL
SOURCE SOURCE: NORMAL
SOURCE SOURCE: NORMAL

## 2018-11-10 NOTE — PROGRESS NOTES
Returned from the OR following exploration  Extensive Necrotic bowel  Overall illness in not survivable  Placed on comfort care and later succumbed    ROXI Dupont MD

## 2018-11-10 NOTE — OP REPORT
Operative Report      Patient:                                   Leanna Campbell  :                                       1940  MRN:                                       [id number]  [facility]:                                       [id number]     Date of Surgery:                    2018      Surgeon:                                Christopher Yañez M.D.      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  2) severe COPD on home oxygen and multiple medications  3) severe protein calorie malnutrition  4) history of previous small bowel obstruction  5) history of cholecystectomy and bile leak  6) history of breast cancer and recurrent breast cancer  7) alcohol dependency  8) tobacco use  9) open abdomen  10) status post small bowel resection x2 with primary anastomosis x2  11) atherosclerotic superior mesenteric artery occlusive disease     Post-operative Diagnosis:     1) closed-loop small bowel obstruction and small bowel necrosis  2) severe COPD on home oxygen and multiple medications  3) severe protein calorie malnutrition  4) history of previous small bowel obstruction  5) history of cholecystectomy and bile leak  6) history of breast cancer and recurrent breast cancer  7) alcohol dependency  8) tobacco use  9) open abdomen  10) status post small bowel resection x2 with primary anastomosis x2  11) atherosclerotic superior mesenteric artery occlusive disease     Procedure:                               -Exploratory laparotomy  -Lysis of adhesions, approximately 30 minutes  -Small bowel resection with primary anastomosis  -Placement of negative pressure dressing greater than 50 cm² (therapy)     Anesthesia:                            General     Blood Loss:                           10 cc     Specimen:                              None     Findings:                                Diffuse patchy necrosis of the  entire remainder of the small bowel.      Unsalvageable     Wound classification:          Dirty/infected     Disposition:                           Intubated, to ICU        Indications:  78-year-old female with multiple comorbidities and an extensive abdominal surgical history who presented to the ER 4 days ago and underwent exploration with small bowel resection and reanastomosis.  She was unstable and an AB Thera was placed with plan for a second look.  The second look was performed on November 7 2018 by Dr. abreu and he identified additional necrotic small bowel and performed a second small bowel resection with primary reanastomosis.  She was once again left open with an AB Thera wound VAC.  I am bringing her back today for a third look laparotomy to make sure the remaining bowel is viable.    Unfortunately the patient has suffered a left lacunar stroke since the time of the last operation and also she is known to have severe emphysema and other comorbidities.  She also has only very short piece of small intestine remaining and is at risk for short gut syndrome.  I discussed all these things with the family prior to going to the operating room and I told him that if additional necrotic bowel was identified that she would not be salvageable and we would need to make her comfort care. We discussed the risks which are extensive and include but are not limited to bleeding, infection, injury to vessels or nerves, injury to intra-abdominal organs, risks of anesthesia, possibility of an ostomy, possibility of wound VAC, possibility of a prolonged intensive care unit stay and a prolonged hospital stay, and global risks such as stroke, heart attack, pulmonary complications which she is high risk for, possible multiorgan failure, and even a very real possibility that she may not survive the operation or the subsequent recovery.  Patient and her daughters understood all this information and agreed to proceed.  Informed  consent was obtained.     Procedure in detail:  The patient was identified in the pre-operative holding area and brought to the operating room. Pre-operative antibiotics were administered prior to the procedure. Anesthesia was smoothly induced. The patient was prepped and draped in the usual sterile fashion. A surgical time out was called to identify the correct patient, procedure, and equipment.  Everyone was in agreement.       The inner portion of the wound VAC was removed revealing the abdominal contents.  Necrotic omentum was identified on the surface of the bowel.  We then moved the overlying colon out of the way and we found the residual small bowel and there was diffuse patchy full-thickness necrosis throughout the entire remainder of the small bowel.  There was no small bowel that could be salvaged.  At this point I deemed to the patient unsalvageable based on the intra-abdominal necrosis and her significant number of other comorbidities.  The midline incision was closed with a running size 1 Vicryl suture for the fascia and a running #2 nylon suture for the skin.  A dry sterile bandage was placed.    Sponge and needle counts were correct at the end of the case.  Patient was returned to the ICU in terminal condition.     Postoperative plan:  After the operation I had a detailed discussion with the family regarding her findings.  I explained to them that this extent of necrosis makes her recovery impossible.  Family is agreeable to a comfort care pathway.  All their questions were answered and I provided them with my direct contact information for any additional questions that may arise.  I also informed the ICU nurse and intensivist that the patient will be initiated on a comfort care pathway.     Christopher Yañez MD  General and Vascular Surgery  [facility]  Cell [phone]  Office [phone]

## 2018-11-10 NOTE — PROGRESS NOTES
@1310 Pt transported to CT with transporter, ACLS RN, and RT. VSS.    @1325 Pt returned to S124 with transporter, ACLS RN, and RT. VSS.

## 2018-11-10 NOTE — PROGRESS NOTES
Call received from cardiology concerning echocardiogram. Dr. Dupont informed of results of the scan. Stat CT PE study ordered as well as Stat CT of the head.

## 2018-11-10 NOTE — DISCHARGE SUMMARY
DISCHARGE SUMMARY    Leanna Campbell  [id number]  [id number]  _____________________________________    DATE OF ADMISSION: 11/4/2018    DATE OF DISCHARGE: 11/9/2018    ADMISSION DIAGNOSIS (ES):  1) closed-loop small bowel obstruction  2) severe COPD on home oxygen and multiple medications  3) severe protein calorie malnutrition  4) history of previous small bowel obstruction  5) history of cholecystectomy and bile leak  6) history of breast cancer and recurrent breast cancer  7) alcohol dependency  8) tobacco use      DISCHARGE DIAGNOSIS (ES):  1) closed-loop small bowel obstruction  2) severe COPD on home oxygen and multiple medications  3) severe protein calorie malnutrition  4) history of previous small bowel obstruction  5) history of cholecystectomy and bile leak  6) history of breast cancer and recurrent breast cancer  7) alcohol dependency  8) tobacco use    CONSULTATIONS:  Surgical intensive care service    PROCEDURES:  11/4/2018  exploratory laparotomy, small bowel resection (Dr. Yañez)  11/7/2018 exploratory laparotomy, small bowel resection (Dr. De Guzman)  11/9/2018 exploratory laparotomy, abdominal closure (Dr. Yañez)    HOSPITAL COURSE:  Leanna Campbell is a 78 y.o. female with multiple comorbidities and severe protein calorie malnutrition with a BMI of only 17.  Patient also has an extensive abdominal surgical history, including a history of a bile leak after laparoscopic cholecystectomy and then subsequent lysis of adhesions.  Patient also has a history of breast cancer and recurrent breast cancer and has had a bilateral total mastectomy.    Patient presented to [facility] on 11/4/2018 with diffuse abdominal pain and distention.  She was found to have a closed loop small bowel obstruction which was most likely from her adhesive disease.  She underwent emergent exploration and a closed loop obstruction was identified and the involved segment was frankly necrotic and therefore resected and a primary  anastomosis was performed.  Patient was unstable on 2 pressors and there was diffuse peritonitis so an AB Thera was placed and she was sent to the intensive care unit.  Patient stabilized over the next 48 hours.  She was brought back for a second look laparotomy where additional small bowel had undergone necrosis and was therefore resected and a primary anastomosis was performed.  This operation was done by Dr. De Guzman.    Dr. De Guzman and I discussed the intraoperative findings and his concern was raised for possible mesenteric artery disease.  We carefully reviewed the CT scan that was done on admission and this showed diffuse mild to moderate atherosclerotic disease throughout the SMA and its jejunal branches.  There was no focal stenosis or occlusion that was hemodynamically significant enough to warrant SMA reconstruction.  I suspect it was the disease stretching out into the smaller branches that ultimately led to the bowel necrosis, and this was exacerbated by the patient's critical condition with sepsis and the initial necrotic bowel that she presented with and the course of vasopressor requirement that she had early on in her course.    Over the next 48 hours in the intensive care unit the patient began to demonstrate a dropping blood pressure and an echocardiogram was performed showing right heart strain and then subsequently a CT angiogram was done which ruled out any pulmonary embolism.  There was however severe emphysema and that could explain the echocardiogram findings.  The patient also developed weakness in her right arm and right leg and a CT scan of the head was done which showed a left basal ganglia infarct.    Ultimately the patient was brought back to the operating room on 11/9/2018.  The remaining small bowel had diffuse patchy full-thickness necrosis.  At this point the patient became unsalvageable.  A midline incision was closed and the patient was sent back to the intensive care unit and a  discussion was held with the family regarding a comfort care course.  The family was very understanding and agreed to a comfort care pathway and the patient  at around 1730 on 2018.      Christopher Yañez MD  [facility] (General and Vascular Surgery)  Cell: [phone]  Office: [phone]  __________________________________________________________________  Patient:Leanna Campbell   MRN:[id number]   CSN:[id number]    2018    7:38 PM

## 2018-11-10 NOTE — RESPIRATORY CARE
Extubation    Cuff leak noted yes  Stridor present no           Patient toleration well  RCP Complete? yes

## 2018-11-10 NOTE — PROGRESS NOTES
Per family request, patient made comfort care. Pt extubated with RT and  paged. Bereavement tray ordered.

## 2018-11-17 NOTE — ADDENDUM NOTE
Encounter addended by: Jonathan L. Yahr, R.N. on: 11/16/2018  7:30 PM<BR>    Actions taken: Flowsheet accepted

## 2022-10-12 NOTE — ADDENDUM NOTE
Encounter addended by: Westleycorrection Provider on: 10/12/2022 1:28 PM   Actions taken: Delete clinical note

## (undated) DEVICE — LEAD SET 6 DISP. EKG NIHON KOHDEN

## (undated) DEVICE — STAPLER SKIN DISP - (6/BX 10BX/CA) VISISTAT

## (undated) DEVICE — NEPTUNE 4 PORT MANIFOLD - (20/PK)

## (undated) DEVICE — SUTURE 3-0 VICRYL PLUS SH - 8X 18 INCH (12/BX)

## (undated) DEVICE — HEAD HOLDER JUNIOR/ADULT

## (undated) DEVICE — GOWN SURGEONS X-LARGE - DISP. (30/CA)

## (undated) DEVICE — GAUZE FLUFF STERILE 2-PLY 36 X 36 (100EA/CA)

## (undated) DEVICE — GOWN WARMING STANDARD FLEX - (30/CA)

## (undated) DEVICE — CLOSURE SKIN STRIP 1/2 X 4 IN - (STERI STRIP) (50/BX 4BX/CA)

## (undated) DEVICE — CANISTER SUCTION 3000ML MECHANICAL FILTER AUTO SHUTOFF MEDI-VAC NONSTERILE LF DISP  (40EA/CA)

## (undated) DEVICE — SET LEADWIRE 5 LEAD BEDSIDE DISPOSABLE ECG (1SET OF 5/EA)

## (undated) DEVICE — LACTATED RINGERS INJ 1000 ML - (14EA/CA 60CA/PF)

## (undated) DEVICE — CLIP MED LG INTNL HRZN TI ESCP - (20/BX)

## (undated) DEVICE — SENSOR SPO2 NEO LNCS ADHESIVE (20/BX) SEE USER NOTES

## (undated) DEVICE — GLOVE BIOGEL SZ 8 SURGICAL PF LTX - (50PR/BX 4BX/CA)

## (undated) DEVICE — CANISTER SUCTION RIGID RED 1500CC (40EA/CA)

## (undated) DEVICE — LIGASURE TISSUE FUSION  - SINGLE USE (6/CA)

## (undated) DEVICE — BLOCK

## (undated) DEVICE — KIT  I.V. START (100EA/CA)

## (undated) DEVICE — CHLORAPREP 26 ML APPLICATOR - ORANGE TINT(25/CA)

## (undated) DEVICE — BOVIE  BLADE 6 EXTENDED - (50/PK)

## (undated) DEVICE — GLOVE BIOGEL SZ 7.5 SURGICAL PF LTX - (50PR/BX 4BX/CA)

## (undated) DEVICE — GLOVE SZ 6.5 BIOGEL PI MICRO - PF LF (50PR/BX)

## (undated) DEVICE — TOWELS CLOTH SURGICAL - (4/PK 20PK/CA)

## (undated) DEVICE — SET SUCT.W/SLEEVE VIA-GUARD - (10/BX10BX/CA)

## (undated) DEVICE — SUTURE 4-0 MONOCRYL PLUS PS-2 - 27 INCH (36/BX)

## (undated) DEVICE — SODIUM CHL IRRIGATION 0.9% 1000ML (12EA/CA)

## (undated) DEVICE — DRAIN J-VAC 7MM FLAT - (10EA/CA)

## (undated) DEVICE — SLEEVE, VASO, THIGH, MED

## (undated) DEVICE — GLOVE BIOGEL INDICATOR SZ 6.5 SURGICAL PF LTX - (50PR/BX 4BX/CA)

## (undated) DEVICE — ELECTRODE DUAL RETURN W/ CORD - (50/PK)

## (undated) DEVICE — MANIFOLD NEPTUNE 1 PORT

## (undated) DEVICE — SUTURE GENERAL

## (undated) DEVICE — KIT DRESSING WOUND VAC ABDOMEN W/TRAC PAD

## (undated) DEVICE — SUTURE 1 PDS II PLUS TP-1 - (12PK/BX)

## (undated) DEVICE — GLOVE SZ 7 BIOGEL PI MICRO - PF LF (50PR/BX 4BX/CA)

## (undated) DEVICE — GLOVE, BIOGEL ECLIPSE, SZ 7.0, PF LTX (50/BX)

## (undated) DEVICE — SUTURE 0 COATED VICRYL 6-18IN - (12PK/BX)

## (undated) DEVICE — SUTURE 2-0 COATED VICRYL PLUS - 12 X 18 INCH (12/BX)

## (undated) DEVICE — DRESSING NON-ADHERING 8 X 3 - (50/BX)

## (undated) DEVICE — TUBE E-T HI-LO CUFF 8.0MM (10EA/PK)

## (undated) DEVICE — SUTURE 2 ETHILON LR D/A - (24/BX) 30 INCH

## (undated) DEVICE — CLIP MED INTNL HRZN TI ESCP - (25/BX)

## (undated) DEVICE — BOVIE BLADE COATED &INSULATED - 25/PK

## (undated) DEVICE — GLOVE BIOGEL PI INDICATOR SZ 7.5 SURGICAL PF LF -(50/BX 4BX/CA)

## (undated) DEVICE — PROTECTOR ULNA NERVE - (36PR/CA)

## (undated) DEVICE — COVER PROBE STERILE CONE (12EA/CA)

## (undated) DEVICE — PAD LAP STERILE 18 X 18 - (5/PK 40PK/CA)

## (undated) DEVICE — DRAPE IOBAN II INCISE 23X17 - (10EA/BX 4BX/CA)

## (undated) DEVICE — GOWN SURGICAL XX-LARGE - (28EA/CA) SIRUS NON REINFORCED

## (undated) DEVICE — ELECTRODE 850 FOAM ADHESIVE - HYDROGEL RADIOTRNSPRNT (50/PK)

## (undated) DEVICE — STAPLE 60MM BLUE 3.5MM - ECHELON (12/BX)

## (undated) DEVICE — DRAPE LAPAROTOMY T SHEET - (12EA/CA)

## (undated) DEVICE — GLOVE BIOGEL PI INDICATOR SZ 7.0 SURGICAL PF LF - (50/BX 4BX/CA)

## (undated) DEVICE — RESERVOIR SUCTION 100 CC - SILICONE (20EA/CA)

## (undated) DEVICE — VAC CANISTER W/GEL 500ML - FITS NEW MACHINES (10EA/CA)

## (undated) DEVICE — SPONGE LAP XR ST 36X36 LF - (1/PK40PK/CA)

## (undated) DEVICE — TUBING CLEARLINK DUO-VENT - C-FLO (48EA/CA)

## (undated) DEVICE — COVER CIV-FLEX TRANSDUCER - (24/BX)

## (undated) DEVICE — TRAY SKIN SCRUB PVP WET (20EA/CA) PART #DYND70356 DISCONTINUED

## (undated) DEVICE — MASK ANESTHESIA ADULT  - (100/CA)

## (undated) DEVICE — SPONGE GAUZESTER 4 X 4 4PLY - (128PK/CA)

## (undated) DEVICE — SUCTION INSTRUMENT YANKAUER BULBOUS TIP W/O VENT (50EA/CA)

## (undated) DEVICE — PACK MAJOR BASIN - (2EA/CA)

## (undated) DEVICE — CATHETER IV 20 GA X 1-1/4 ---SURG.& SDS ONLY--- (50EA/BX)

## (undated) DEVICE — STAPLE 75MM LINEAR (12EA/BX)

## (undated) DEVICE — GOWN SURGICAL X-LARGE ULTRA - FILM-REINFORCED (20/CA)

## (undated) DEVICE — DRESSING ABDOMINAL PAD STERILE 8 X 10" (360EA/CA)"

## (undated) DEVICE — PEN SKIN MARKER W/RULER - (50EA/BX)

## (undated) DEVICE — GLOVE BIOGEL SZ 7 SURGICAL PF LTX - (50PR/BX 4BX/CA)

## (undated) DEVICE — SHEAR HS FOCUS 9CM CVD - (6/BX)

## (undated) DEVICE — KIT ANESTHESIA W/CIRCUIT & 3/LT BAG W/FILTER (20EA/CA)

## (undated) DEVICE — GLOVE BIOGEL SZ 6 PF LATEX - (50EA/BX 4BX/CA)

## (undated) DEVICE — TUBE CONNECT SUCTION CLEAR 120 X 1/4" (50EA/CA)"

## (undated) DEVICE — GLOVE SZ 7.5 BIOGEL PI MICRO - PF LF (50PR/BX)

## (undated) DEVICE — SUTURE 2-0 VICRYL PLUS SH - 27 INCH (36/BX)

## (undated) DEVICE — SPONGE DRAIN 4 X 4IN 6-PLY - (2/PK25PK/BX12BX/CS)

## (undated) DEVICE — TUBE CONNECTING SUCTION - CLEAR PLASTIC STERILE 72 IN (50EA/CA)

## (undated) DEVICE — SUTURE 3-0 ETHILON FS-1 - (36/BX) 30 INCH

## (undated) DEVICE — SUTURE 3-0 SILK SH C/R 18 IN - (12/BX)

## (undated) DEVICE — PACK MINOR BASIN - (2EA/CA)

## (undated) DEVICE — GOWN SURGEONS LARGE - (32/CA)

## (undated) DEVICE — MASK AIRWAY SIZE 3 UNIQUE SILICON (10/BX)

## (undated) DEVICE — SUTURE 1 VICRYL PLUS CTX - 36 INCH (36/BX)

## (undated) DEVICE — DRAPE MAYO STAND - (30/CA)

## (undated) DEVICE — CLIP SM INTNL HRZN TI ESCP LGT - (24EA/PK 25PK/BX)

## (undated) DEVICE — KIT ROOM DECONTAMINATION

## (undated) DEVICE — TRAY MULTI-LUMEN 7FR PRESSURE W/MAX BARRIER AND BIOPATCH - (5/CA)

## (undated) DEVICE — DRAPE SURGICAL U 77X120 - (10/CA)

## (undated) DEVICE — SET EXTENSION WITH 2 PORTS (48EA/CA) ***PART #2C8610 IS A SUBSTITUTE*****

## (undated) DEVICE — DRAPE LARGE 3 QUARTER - (20/CA)

## (undated) DEVICE — GLOVE BIOGEL PI ORTHO SZ 6 1/2 SURGICAL PF LF (40PR/BX)

## (undated) DEVICE — GLOVE BIOGEL INDICATOR SZ 7.5 SURGICAL PF LTX - (50PR/BX 4BX/CA)

## (undated) DEVICE — GRAFT MESH SEPRAFILM PRO PACK - 5/BX CONTAINS 6 3X5 PIECES

## (undated) DEVICE — BLANKET WARMING UPPER BODY - (10/CA)

## (undated) DEVICE — BLADE SURGICAL #10 - (50/BX)

## (undated) DEVICE — CLIP LG INTNL HRZN TI ESCP LGT - (20/BX)

## (undated) DEVICE — MANIFOLD NEPTUNE 1 PORT (20/PK)

## (undated) DEVICE — STAPLER 75MM LINEAR OPEN (3EA/BX)

## (undated) DEVICE — DRAPE MEDI-SLUSH STERILE  - (40/CA)